# Patient Record
Sex: MALE | Race: WHITE | NOT HISPANIC OR LATINO | Employment: UNEMPLOYED | URBAN - METROPOLITAN AREA
[De-identification: names, ages, dates, MRNs, and addresses within clinical notes are randomized per-mention and may not be internally consistent; named-entity substitution may affect disease eponyms.]

---

## 2018-01-07 ENCOUNTER — OFFICE VISIT (OUTPATIENT)
Dept: URGENT CARE | Facility: CLINIC | Age: 53
End: 2018-01-07

## 2018-01-07 VITALS
RESPIRATION RATE: 20 BRPM | SYSTOLIC BLOOD PRESSURE: 114 MMHG | HEART RATE: 118 BPM | BODY MASS INDEX: 33.46 KG/M2 | WEIGHT: 239 LBS | OXYGEN SATURATION: 95 % | TEMPERATURE: 96.8 F | HEIGHT: 71 IN | DIASTOLIC BLOOD PRESSURE: 90 MMHG

## 2018-01-07 DIAGNOSIS — J40 BRONCHITIS: ICD-10-CM

## 2018-01-07 PROCEDURE — 99203 OFFICE O/P NEW LOW 30 MIN: CPT | Performed by: PHYSICIAN ASSISTANT

## 2018-01-07 RX ORDER — CIPROFLOXACIN 500 MG/1
500 TABLET, FILM COATED ORAL 2 TIMES DAILY
Qty: 20 TAB | Refills: 0 | Status: SHIPPED | OUTPATIENT
Start: 2018-01-07 | End: 2018-01-17

## 2018-01-07 RX ORDER — GABAPENTIN 300 MG/1
300 CAPSULE ORAL 3 TIMES DAILY
Qty: 90 CAP | Refills: 2 | Status: ON HOLD | OUTPATIENT
Start: 2018-01-07 | End: 2023-12-04

## 2018-01-07 ASSESSMENT — ENCOUNTER SYMPTOMS
EYES NEGATIVE: 1
CARDIOVASCULAR NEGATIVE: 1
BACK PAIN: 1
WHEEZING: 0
ABDOMINAL PAIN: 0
RHINORRHEA: 1
SHORTNESS OF BREATH: 0
COUGH: 1
CONSTITUTIONAL NEGATIVE: 1
SPUTUM PRODUCTION: 1
HEADACHES: 0

## 2018-01-07 NOTE — LETTER
January 7, 2018         Patient: Quan Quesada   YOB: 1965   Date of Visit: 1/7/2018           To Whom it May Concern:    Quan Quesada was seen in my clinic on 1/7/2018 for acute illness; please excuse today [and Thursday, as needed].    If you have any questions or concerns, please don't hesitate to call.        Sincerely,           Prasanna Ayers P.A.-C.  Electronically Signed

## 2018-01-07 NOTE — PROGRESS NOTES
"Subjective:      Quan Quesada is a 52 y.o. male who presents with URI (2 weeks off and on) and Back Pain            URI    This is a new (2wks cough, richard, ) problem. The current episode started 1 to 4 weeks ago. The problem has been unchanged. There has been no fever. Associated symptoms include congestion, coughing and rhinorrhea. Pertinent negatives include no abdominal pain, chest pain, headaches or wheezing. He has tried nothing for the symptoms. The treatment provided no relief.   Back Pain   Pertinent negatives include no abdominal pain, chest pain or headaches.       Review of Systems   Constitutional: Negative.    HENT: Positive for congestion and rhinorrhea.    Eyes: Negative.    Respiratory: Positive for cough and sputum production. Negative for shortness of breath and wheezing.    Cardiovascular: Negative.  Negative for chest pain.   Gastrointestinal: Negative for abdominal pain.   Musculoskeletal: Positive for back pain.   Skin: Negative.    Neurological: Negative for headaches.          Objective:     /90   Pulse (!) 118   Temp 36 °C (96.8 °F)   Resp 20   Ht 1.803 m (5' 11\")   Wt 108.4 kg (239 lb)   SpO2 95%   BMI 33.33 kg/m²      Physical Exam   Constitutional: He is oriented to person, place, and time. He appears well-developed and well-nourished. No distress.   HENT:   Head: Normocephalic and atraumatic.   Mouth/Throat: Oropharynx is clear and moist.   Eyes: EOM are normal. Pupils are equal, round, and reactive to light.   Neck: Normal range of motion. Neck supple.   Cardiovascular: Normal rate.    Pulmonary/Chest: Effort normal and breath sounds normal. No respiratory distress. He has no wheezes. He has no rales.   Lymphadenopathy:     He has no cervical adenopathy.   Neurological: He is alert and oriented to person, place, and time.   Skin: Skin is warm and dry.   Psychiatric: He has a normal mood and affect. His behavior is normal.   Nursing note and vitals reviewed.    Vitals: " "   01/07/18 0920   BP: 114/90   Pulse: (!) 118   Resp: 20   Temp: 36 °C (96.8 °F)   SpO2: 95%   Weight: 108.4 kg (239 lb)   Height: 1.803 m (5' 11\")     Active Ambulatory Problems     Diagnosis Date Noted   • Umbilical hernia 01/09/2015   • Ventral hernia 01/09/2015     Resolved Ambulatory Problems     Diagnosis Date Noted   • No Resolved Ambulatory Problems     Past Medical History:   Diagnosis Date   • Arrhythmia 2010   • Gout    • Indigestion    • Kidney stones 2007   • Snoring      Current Outpatient Prescriptions on File Prior to Visit   Medication Sig Dispense Refill   • oxycodone-acetaminophen (PERCOCET) 7.5-325 MG per tablet Take 1-2 Tabs by mouth every four hours as needed for Moderate Pain ((pain scale 4-6)). 40 Tab 0     No current facility-administered medications on file prior to visit.      Social History     Social History   • Marital status: Single     Spouse name: N/A   • Number of children: N/A   • Years of education: N/A     Occupational History   • Not on file.     Social History Main Topics   • Smoking status: Former Smoker     Packs/day: 1.00     Years: 20.00     Types: Cigarettes     Quit date: 3/3/2013   • Smokeless tobacco: Never Used   • Alcohol use Yes      Comment: 4-8 week   • Drug use: No   • Sexual activity: Not on file     Other Topics Concern   • Not on file     Social History Narrative   • No narrative on file     History reviewed. No pertinent family history.  Patient has no known allergies.            Assessment/Plan:     ·  bronchitis      · rx meds; otc prn      "

## 2018-02-09 ENCOUNTER — OFFICE VISIT (OUTPATIENT)
Dept: URGENT CARE | Facility: CLINIC | Age: 53
End: 2018-02-09
Payer: COMMERCIAL

## 2018-02-09 ENCOUNTER — APPOINTMENT (OUTPATIENT)
Dept: RADIOLOGY | Facility: IMAGING CENTER | Age: 53
End: 2018-02-09
Attending: NURSE PRACTITIONER
Payer: COMMERCIAL

## 2018-02-09 VITALS
RESPIRATION RATE: 16 BRPM | SYSTOLIC BLOOD PRESSURE: 124 MMHG | DIASTOLIC BLOOD PRESSURE: 86 MMHG | WEIGHT: 239 LBS | HEART RATE: 99 BPM | HEIGHT: 71 IN | OXYGEN SATURATION: 96 % | BODY MASS INDEX: 33.46 KG/M2 | TEMPERATURE: 97.2 F

## 2018-02-09 DIAGNOSIS — S99.912A INJURY OF LEFT ANKLE, INITIAL ENCOUNTER: ICD-10-CM

## 2018-02-09 DIAGNOSIS — S93.402A SPRAIN OF LEFT ANKLE, UNSPECIFIED LIGAMENT, INITIAL ENCOUNTER: ICD-10-CM

## 2018-02-09 DIAGNOSIS — S99.922A INJURY OF LEFT FOOT, INITIAL ENCOUNTER: ICD-10-CM

## 2018-02-09 PROCEDURE — 73610 X-RAY EXAM OF ANKLE: CPT | Mod: TC,LT | Performed by: NURSE PRACTITIONER

## 2018-02-09 PROCEDURE — 73630 X-RAY EXAM OF FOOT: CPT | Mod: TC,LT | Performed by: NURSE PRACTITIONER

## 2018-02-09 PROCEDURE — 99213 OFFICE O/P EST LOW 20 MIN: CPT | Performed by: NURSE PRACTITIONER

## 2018-02-09 RX ORDER — NAPROXEN 250 MG/1
250 TABLET ORAL 2 TIMES DAILY WITH MEALS
COMMUNITY
End: 2023-11-29

## 2018-02-09 ASSESSMENT — ENCOUNTER SYMPTOMS
FOCAL WEAKNESS: 0
NEUROLOGICAL NEGATIVE: 1
TINGLING: 0
GASTROINTESTINAL NEGATIVE: 1
PSYCHIATRIC NEGATIVE: 1
CARDIOVASCULAR NEGATIVE: 1
NECK PAIN: 0
SENSORY CHANGE: 0
BACK PAIN: 0
RESPIRATORY NEGATIVE: 1
CONSTITUTIONAL NEGATIVE: 1

## 2018-02-09 NOTE — LETTER
February 9, 2018         Patient: Quan Quesada   YOB: 1965   Date of Visit: 2/9/2018           To Whom it May Concern:    Quan Quesada was seen in my clinic on 2/9/2018. He may be excused from work his next four scheduled shifts.     If you have any questions or concerns, please don't hesitate to call.        Sincerely,           SHANI Mckenzie.  Electronically Signed

## 2018-02-12 ENCOUNTER — OFFICE VISIT (OUTPATIENT)
Dept: MEDICAL GROUP | Facility: CLINIC | Age: 53
End: 2018-02-12
Payer: COMMERCIAL

## 2018-02-12 VITALS
WEIGHT: 239 LBS | HEART RATE: 98 BPM | HEIGHT: 71 IN | SYSTOLIC BLOOD PRESSURE: 124 MMHG | RESPIRATION RATE: 18 BRPM | OXYGEN SATURATION: 95 % | DIASTOLIC BLOOD PRESSURE: 84 MMHG | BODY MASS INDEX: 33.46 KG/M2 | TEMPERATURE: 97.9 F

## 2018-02-12 DIAGNOSIS — M25.572 ACUTE LEFT ANKLE PAIN: ICD-10-CM

## 2018-02-12 DIAGNOSIS — M19.172 POST-TRAUMATIC OSTEOARTHRITIS OF LEFT ANKLE: ICD-10-CM

## 2018-02-12 PROCEDURE — 99203 OFFICE O/P NEW LOW 30 MIN: CPT | Performed by: FAMILY MEDICINE

## 2018-02-12 ASSESSMENT — ENCOUNTER SYMPTOMS
SHORTNESS OF BREATH: 0
VOMITING: 0
FEVER: 0
CHILLS: 0
DIZZINESS: 0
HEADACHES: 0
NAUSEA: 0

## 2018-03-01 ENCOUNTER — OFFICE VISIT (OUTPATIENT)
Dept: MEDICAL GROUP | Facility: CLINIC | Age: 53
End: 2018-03-01
Payer: COMMERCIAL

## 2018-03-01 VITALS
HEIGHT: 71 IN | SYSTOLIC BLOOD PRESSURE: 122 MMHG | OXYGEN SATURATION: 94 % | BODY MASS INDEX: 33.46 KG/M2 | RESPIRATION RATE: 18 BRPM | TEMPERATURE: 97.9 F | WEIGHT: 239 LBS | DIASTOLIC BLOOD PRESSURE: 84 MMHG | HEART RATE: 98 BPM

## 2018-03-01 DIAGNOSIS — M19.172 POST-TRAUMATIC OSTEOARTHRITIS OF LEFT ANKLE: ICD-10-CM

## 2018-03-01 PROCEDURE — 99213 OFFICE O/P EST LOW 20 MIN: CPT | Performed by: FAMILY MEDICINE

## 2018-03-01 ASSESSMENT — ENCOUNTER SYMPTOMS
HEADACHES: 0
FEVER: 0
NAUSEA: 0
SHORTNESS OF BREATH: 0
DIZZINESS: 0
VOMITING: 0
CHILLS: 0

## 2018-03-01 NOTE — PATIENT INSTRUCTIONS
Discussed osteoarthritis management options includin.  Joint protection, glucosamine/chondroitin and anti-inflammatories or tylenol with flares  2.  Non-offending activities such as cycling or swimming   3.  Ankle bracing, Acupuncture is also another option  4. Injection options including corticosteroid  5. Surgical options exist for individuals that have failed non-surgical management

## 2018-03-01 NOTE — PROGRESS NOTES
Subjective:      Quan Quesada is a 52 y.o. male who presents with Ankle Injury (F/V L ankle sprain )       FOLLOW up for LEFT ankle pain    HPI   LEFT ankle pain  Date of injury February 7, 2018  Inversion injury while walking  Pain was a gradual onset  Burning pain  Medial ankle and the dorsal aspect of the foot  No radiation    NO swelling   Question of possible remote injury, however patient does not recall details (chopping wood several yrs ago)  No night symptoms  Stopped using boot a few days ago since pain was better    Review of Systems   Constitutional: Negative for chills and fever.   Respiratory: Negative for shortness of breath.    Cardiovascular: Negative for chest pain.   Gastrointestinal: Negative for nausea and vomiting.   Neurological: Negative for dizziness and headaches.     PMH:  has a past medical history of Arrhythmia (2010); Gout; Indigestion; Kidney stones (2007); and Snoring.  MEDS:   Current Outpatient Prescriptions:   •  naproxen (NAPROSYN) 250 MG Tab, Take 250 mg by mouth 2 times a day, with meals., Disp: , Rfl:   •  gabapentin (NEURONTIN) 300 MG Cap, Take 1 Cap by mouth 3 times a day., Disp: 90 Cap, Rfl: 2  •  oxycodone-acetaminophen (PERCOCET) 7.5-325 MG per tablet, Take 1-2 Tabs by mouth every four hours as needed for Moderate Pain ((pain scale 4-6))., Disp: 40 Tab, Rfl: 0  ALLERGIES: No Known Allergies  SURGHX:   Past Surgical History:   Procedure Laterality Date   • VENTRAL HERNIA REPAIR  1/9/2015    Performed by Romel Wilson M.D. at SURGERY [facility]   • UMBILICAL HERNIA REPAIR  1/9/2015    Performed by Romel Wilson M.D. at SURGERY [facility]   • OTHER  1969    hernia umbilical   • OTHER      cosmetic forehead   • PB APPENDECTOMY      age 12     SOCHX:  reports that he quit smoking about 4 years ago. His smoking use included Cigarettes. He has a 20.00 pack-year smoking history. He has never used smokeless tobacco. He reports that he drinks alcohol. He reports  "that he does not use drugs.  FH: Family history was reviewed, no pertinent findings to report       Objective:     /84   Pulse 98   Temp 36.6 °C (97.9 °F)   Resp 18   Ht 1.803 m (5' 10.98\")   Wt 108.4 kg (239 lb)   SpO2 94%   BMI 33.35 kg/m²      Physical Exam        LEFT ANKLE:  There is NO swelling noted at the ankle  Range of motion NORMAL with dorsiflexion and plantarflexion, inversion and eversion  There is NO tenderness of the ATFL, without tenderness of the CF or PTf ligament  There is NO tenderness of the lateral malleolus or medial malleolus  Anterior drawer testing is POSITIVE  Talar tilt testing is NEGATIVE  The foot and ankle is otherwise neurovascularly intact    LEFT FOOT:  There is NO swelling noted at the foot  There is NO tenderness at the base of the fifth metatarsal, cuboid, or tarsal navicular  There is NO pain with metatarsal squeeze test    NEUTRAL stance  Able to ambulate with ANTALGIC gait  NOW ABLE TOE off on BOTH FEET       Assessment/Plan:     1. Post-traumatic osteoarthritis of left ankle        LEFT ankle pain with posttraumatic arthritis which is likely causing his pain  Unlikely tibialis tendinitis since he can toe off without difficulty    Follow up PRN, CONSIDER intra-articular steroid injection of the LEFT ankle if symptoms persist or worsen      Results for orders placed in visit on 02/09/18   DX-ANKLE 3+ VIEWS LEFT    Impression 1.  No evidence of acute fracture or dislocation.    2.  Old post traumatic change of the medial and lateral malleoli.    3.  Soft tissue swelling.    4.  Joint effusion.                          Results for orders placed in visit on 02/09/18   DX-FOOT-COMPLETE 3+ LEFT    Impression No evidence of acute fracture or dislocation.                                                                  Thank you SAMY Yin for allowing me to participate in caring for your patient.       "

## 2018-12-31 ENCOUNTER — HOSPITAL ENCOUNTER (EMERGENCY)
Dept: HOSPITAL 8 - ED | Age: 53
Discharge: HOME | End: 2018-12-31
Payer: MEDICAID

## 2018-12-31 VITALS — BODY MASS INDEX: 35.49 KG/M2 | HEIGHT: 71 IN | WEIGHT: 253.53 LBS

## 2018-12-31 VITALS — DIASTOLIC BLOOD PRESSURE: 64 MMHG | SYSTOLIC BLOOD PRESSURE: 110 MMHG

## 2018-12-31 DIAGNOSIS — I47.1: Primary | ICD-10-CM

## 2018-12-31 LAB
ALBUMIN SERPL-MCNC: 3.5 G/DL (ref 3.4–5)
ALP SERPL-CCNC: 83 U/L (ref 45–117)
ALT SERPL-CCNC: 47 U/L (ref 12–78)
ANION GAP SERPL CALC-SCNC: 7 MMOL/L (ref 5–15)
BASOPHILS # BLD AUTO: 0.04 X10^3/UL (ref 0–0.1)
BASOPHILS NFR BLD AUTO: 1 % (ref 0–1)
BILIRUB SERPL-MCNC: 0.3 MG/DL (ref 0.2–1)
CALCIUM SERPL-MCNC: 8.1 MG/DL (ref 8.5–10.1)
CHLORIDE SERPL-SCNC: 109 MMOL/L (ref 98–107)
CREAT SERPL-MCNC: 1.05 MG/DL (ref 0.7–1.3)
EOSINOPHIL # BLD AUTO: 0.37 X10^3/UL (ref 0–0.4)
EOSINOPHIL NFR BLD AUTO: 5 % (ref 1–7)
ERYTHROCYTE [DISTWIDTH] IN BLOOD BY AUTOMATED COUNT: 13.9 % (ref 9.4–14.8)
LYMPHOCYTES # BLD AUTO: 1.96 X10^3/UL (ref 1–3.4)
LYMPHOCYTES NFR BLD AUTO: 27 % (ref 22–44)
MCH RBC QN AUTO: 31.6 PG (ref 27.5–34.5)
MCHC RBC AUTO-ENTMCNC: 34.3 G/DL (ref 33.2–36.2)
MCV RBC AUTO: 92.2 FL (ref 81–97)
MD: NO
MONOCYTES # BLD AUTO: 0.62 X10^3/UL (ref 0.2–0.8)
MONOCYTES NFR BLD AUTO: 9 % (ref 2–9)
NEUTROPHILS # BLD AUTO: 4.34 X10^3/UL (ref 1.8–6.8)
NEUTROPHILS NFR BLD AUTO: 59 % (ref 42–75)
PLATELET # BLD AUTO: 217 X10^3/UL (ref 130–400)
PMV BLD AUTO: 8 FL (ref 7.4–10.4)
PROT SERPL-MCNC: 7.2 G/DL (ref 6.4–8.2)
RBC # BLD AUTO: 5.02 X10^6/UL (ref 4.38–5.82)
TROPONIN I SERPL-MCNC: 0.02 NG/ML (ref 0–0.04)

## 2018-12-31 PROCEDURE — 99284 EMERGENCY DEPT VISIT MOD MDM: CPT

## 2018-12-31 PROCEDURE — 80053 COMPREHEN METABOLIC PANEL: CPT

## 2018-12-31 PROCEDURE — 84484 ASSAY OF TROPONIN QUANT: CPT

## 2018-12-31 PROCEDURE — 71045 X-RAY EXAM CHEST 1 VIEW: CPT

## 2018-12-31 PROCEDURE — 93005 ELECTROCARDIOGRAM TRACING: CPT

## 2018-12-31 PROCEDURE — 36415 COLL VENOUS BLD VENIPUNCTURE: CPT

## 2018-12-31 PROCEDURE — 83605 ASSAY OF LACTIC ACID: CPT

## 2018-12-31 PROCEDURE — 85025 COMPLETE CBC W/AUTO DIFF WBC: CPT

## 2019-06-04 ENCOUNTER — APPOINTMENT (OUTPATIENT)
Dept: RADIOLOGY | Facility: IMAGING CENTER | Age: 54
End: 2019-06-04
Attending: PHYSICIAN ASSISTANT
Payer: COMMERCIAL

## 2019-06-04 ENCOUNTER — OFFICE VISIT (OUTPATIENT)
Dept: URGENT CARE | Facility: CLINIC | Age: 54
End: 2019-06-04
Payer: COMMERCIAL

## 2019-06-04 VITALS
HEART RATE: 105 BPM | DIASTOLIC BLOOD PRESSURE: 80 MMHG | HEIGHT: 71 IN | OXYGEN SATURATION: 92 % | RESPIRATION RATE: 16 BRPM | SYSTOLIC BLOOD PRESSURE: 124 MMHG | BODY MASS INDEX: 36.12 KG/M2 | WEIGHT: 258 LBS | TEMPERATURE: 99.4 F

## 2019-06-04 DIAGNOSIS — R05.9 COUGH: ICD-10-CM

## 2019-06-04 DIAGNOSIS — J22 LRTI (LOWER RESPIRATORY TRACT INFECTION): ICD-10-CM

## 2019-06-04 DIAGNOSIS — H10.31 ACUTE BACTERIAL CONJUNCTIVITIS OF RIGHT EYE: ICD-10-CM

## 2019-06-04 PROCEDURE — 99214 OFFICE O/P EST MOD 30 MIN: CPT | Performed by: PHYSICIAN ASSISTANT

## 2019-06-04 PROCEDURE — 71046 X-RAY EXAM CHEST 2 VIEWS: CPT | Mod: TC | Performed by: PHYSICIAN ASSISTANT

## 2019-06-04 RX ORDER — PROMETHAZINE HYDROCHLORIDE AND CODEINE PHOSPHATE 6.25; 1 MG/5ML; MG/5ML
5 SYRUP ORAL EVERY 12 HOURS PRN
Qty: 60 ML | Refills: 0 | Status: SHIPPED | OUTPATIENT
Start: 2019-06-04 | End: 2019-06-11

## 2019-06-04 RX ORDER — POLYMYXIN B SULFATE AND TRIMETHOPRIM 1; 10000 MG/ML; [USP'U]/ML
1 SOLUTION OPHTHALMIC EVERY 4 HOURS
Qty: 10 ML | Refills: 0 | Status: SHIPPED | OUTPATIENT
Start: 2019-06-04 | End: 2019-06-11

## 2019-06-04 RX ORDER — AZITHROMYCIN 250 MG/1
TABLET, FILM COATED ORAL
Qty: 6 TAB | Refills: 0 | Status: SHIPPED | OUTPATIENT
Start: 2019-06-04 | End: 2023-11-29

## 2019-06-04 ASSESSMENT — ENCOUNTER SYMPTOMS
PHOTOPHOBIA: 0
NAUSEA: 0
SHORTNESS OF BREATH: 0
SPUTUM PRODUCTION: 1
ABDOMINAL PAIN: 0
EYE REDNESS: 1
FEVER: 1
SORE THROAT: 1
DIARRHEA: 0
EYE DISCHARGE: 1
WHEEZING: 0
EYE PAIN: 0
COUGH: 1
VOMITING: 0
CHILLS: 1
BLURRED VISION: 0
DOUBLE VISION: 0

## 2019-06-04 NOTE — PROGRESS NOTES
"Subjective:   Quan Quesada is a 53 y.o. male who presents for Cough (st,fever,runny nose x3-4 days)         Cough    This is a new problem. The current episode started in the past 7 days. Associated symptoms include chills, eye redness, a fever and a sore throat. Pertinent negatives include no ear pain, rash, shortness of breath or wheezing.     Patient complains of last 4 to 5 days of symptoms of cough productive in nature, sore throat, subjective fevers and chills, redness irritation and itch and drainage from right eye, denies ear pain, complains of sinus and chest congestion, denies nausea vomiting abdominal pain diarrhea or rash.  Denies past medical history of asthma.  Denies history of pneumonia.  Notes past medical history of bronchitis and seasonal allergies.  Denies treatments thus far    Review of Systems   Constitutional: Positive for chills and fever.   HENT: Positive for congestion and sore throat. Negative for ear pain.    Eyes: Positive for discharge and redness. Negative for blurred vision, double vision, photophobia and pain.   Respiratory: Positive for cough and sputum production. Negative for shortness of breath and wheezing.    Gastrointestinal: Negative for abdominal pain, diarrhea, nausea and vomiting.   Skin: Negative for rash.     No Known Allergies   I have worn a mask for the entire encounter with this patient.    Objective:   /80 (BP Location: Right arm, Patient Position: Sitting, BP Cuff Size: Large adult)   Pulse (!) 105   Temp 37.4 °C (99.4 °F)   Resp 16   Ht 1.803 m (5' 11\")   Wt 117 kg (258 lb)   SpO2 92%   BMI 35.98 kg/m²    Physical Exam   Constitutional: He is oriented to person, place, and time. He appears well-developed and well-nourished. No distress.   HENT:   Head: Normocephalic and atraumatic.   Right Ear: Tympanic membrane, external ear and ear canal normal.   Left Ear: Tympanic membrane, external ear and ear canal normal.   Nose: Nose normal. "   Mouth/Throat: Uvula is midline and mucous membranes are normal. Posterior oropharyngeal erythema ( mild PND) present. No oropharyngeal exudate, posterior oropharyngeal edema or tonsillar abscesses.   Eyes: Pupils are equal, round, and reactive to light. EOM and lids are normal. Right eye exhibits no chemosis and no discharge. Left eye exhibits no chemosis and no discharge. Right conjunctiva is injected. Right conjunctiva has no hemorrhage. Left conjunctiva is not injected. Left conjunctiva has no hemorrhage. No scleral icterus.   Neck: Neck supple.   Pulmonary/Chest: Effort normal. No accessory muscle usage. No respiratory distress. He has no decreased breath sounds. He has no wheezes. He has rhonchi (trace). He has no rales.   Musculoskeletal: Normal range of motion.   Lymphadenopathy:     He has cervical adenopathy ( mild bilat).   Neurological: He is alert and oriented to person, place, and time. Coordination normal.   Skin: Skin is warm and dry. He is not diaphoretic. No pallor.   Psychiatric: He has a normal mood and affect.   Nursing note and vitals reviewed.  CXR - Impression       No acute cardiopulmonary process is identified.   Reading Provider Reading Date   Genaro Mckeon M.D. Jun 4, 2019   Signing Provider Signing Date Signing Time   Genaro Mckeon M.D. Jun 4, 2019  8:48 AM           Assessment/Plan:   1. LRTI (lower respiratory tract infection)    2. Cough  - DX-CHEST-2 VIEWS; Future    3. Acute bacterial conjunctivitis of right eye  Supportive care is reviewed with patient/caregiver - recommend to push PO fluids and electrolytes,  take full course of Rx, take with probiotics, observe for resolution  Return to clinic with lack of resolution or progression of symptoms.  Nsaids/tylenol, netti pot/saline irrig, humidifier in home, flonase, ponaris, antihistamine  polytrim for eye, discussed typical course, Cautioned regarding potential for sedation with medication.    Differential diagnosis,  natural history, supportive care, and indications for immediate follow-up discussed.

## 2023-09-13 ENCOUNTER — OFFICE VISIT (OUTPATIENT)
Dept: URGENT CARE | Facility: CLINIC | Age: 58
End: 2023-09-13
Payer: COMMERCIAL

## 2023-09-13 VITALS
TEMPERATURE: 98.5 F | HEIGHT: 71 IN | OXYGEN SATURATION: 95 % | WEIGHT: 269.4 LBS | RESPIRATION RATE: 20 BRPM | DIASTOLIC BLOOD PRESSURE: 78 MMHG | HEART RATE: 90 BPM | BODY MASS INDEX: 37.72 KG/M2 | SYSTOLIC BLOOD PRESSURE: 130 MMHG

## 2023-09-13 DIAGNOSIS — H11.32 CONJUNCTIVAL HEMORRHAGE OF LEFT EYE: ICD-10-CM

## 2023-09-13 PROCEDURE — 99203 OFFICE O/P NEW LOW 30 MIN: CPT | Performed by: NURSE PRACTITIONER

## 2023-09-13 PROCEDURE — 3078F DIAST BP <80 MM HG: CPT | Performed by: NURSE PRACTITIONER

## 2023-09-13 PROCEDURE — 3075F SYST BP GE 130 - 139MM HG: CPT | Performed by: NURSE PRACTITIONER

## 2023-09-13 RX ORDER — OFLOXACIN 3 MG/ML
1 SOLUTION/ DROPS OPHTHALMIC 4 TIMES DAILY
Qty: 5 ML | Refills: 0 | Status: SHIPPED | OUTPATIENT
Start: 2023-09-13 | End: 2023-09-23

## 2023-09-14 NOTE — PROGRESS NOTES
Date: 09/13/23     Chief Complaint:    Chief Complaint   Patient presents with    Eye Injury     X3days left eye red/no discomfort        History of Present Illness: 58 y.o.  male presents to clinic with a possible injury to his left eye.  Patient states 3 days ago he was lighting a match and a piece of the match flew towards his eye.  He states he is unsure if this piece of the match hit his eye.  He states he had no pain or discomfort after the injury.  He states yesterday he noticed redness to the left lateral part of his eye.  He states it does not hurt or itch.  He denies any vision changes.  He does not wear contacts.  He states he does have an appointment with an eye doctor in 3 days time.      ROS:    As stated in HPI     Medical/SX/ Social History:  Reviewed per chart    Pertinent Medications:    Current Outpatient Medications on File Prior to Visit   Medication Sig Dispense Refill    azithromycin (ZITHROMAX) 250 MG Tab Take as directed on package. Dispense one package. (Patient not taking: Reported on 9/13/2023) 6 Tab 0    naproxen (NAPROSYN) 250 MG Tab Take 250 mg by mouth 2 times a day, with meals. (Patient not taking: Reported on 9/13/2023)      gabapentin (NEURONTIN) 300 MG Cap Take 1 Cap by mouth 3 times a day. 90 Cap 2    oxycodone-acetaminophen (PERCOCET) 7.5-325 MG per tablet Take 1-2 Tabs by mouth every four hours as needed for Moderate Pain ((pain scale 4-6)). (Patient not taking: Reported on 9/13/2023) 40 Tab 0     No current facility-administered medications on file prior to visit.        Allergies:    Patient has no known allergies.     Problem list, medications, and allergies reviewed by myself today in Epic     Physical Exam:    Vitals:    09/13/23 1712   BP: 130/78   Pulse: 90   Resp: 20   Temp: 36.9 °C (98.5 °F)   SpO2: 95%             Physical Exam  Constitutional:       General: He is awake.      Appearance: Normal appearance. He is not ill-appearing, toxic-appearing or diaphoretic.    HENT:      Head: Normocephalic and atraumatic.      Right Ear: Tympanic membrane, ear canal and external ear normal.      Left Ear: Tympanic membrane, ear canal and external ear normal.   Eyes:      General: Lids are normal. Gaze aligned appropriately. No allergic shiner or scleral icterus.     Extraocular Movements: Extraocular movements intact.      Conjunctiva/sclera:      Left eye: Left conjunctiva is not injected. Hemorrhage present. No chemosis or exudate.     Pupils: Pupils are equal, round, and reactive to light.      Comments: Eye exam:  Procedure explained and verbal consent obtained.  left eye was anesthetized with 2 drops of proparacaine.  This did relieve the patient's irritation of his left eye.  Additional drop of proparacaine was used to stain the eye with a fluorescein strip.Using a Wood's lamp, I did assess the eye.  Lid was everted no foreign body noted. No abrasions noted.  Negative Melodie sign.       R 20/25   L20/25   B20/20    Cardiovascular:      Rate and Rhythm: Normal rate and regular rhythm.      Pulses:           Radial pulses are 2+ on the right side and 2+ on the left side.      Heart sounds: Normal heart sounds.   Pulmonary:      Effort: Pulmonary effort is normal.      Breath sounds: Normal breath sounds and air entry. No decreased breath sounds, wheezing, rhonchi or rales.   Musculoskeletal:      Right lower leg: No edema.      Left lower leg: No edema.   Lymphadenopathy:      Cervical: No cervical adenopathy.   Skin:     General: Skin is warm.      Capillary Refill: Capillary refill takes less than 2 seconds.      Coloration: Skin is not cyanotic or pale.   Neurological:      Mental Status: He is alert and oriented to person, place, and time.      Gait: Gait is intact.   Psychiatric:         Behavior: Behavior normal. Behavior is cooperative.            Medical Decision making and plan :  I personally reviewed prior external notes and test results pertinent to today's visit. Pt is  clinically stable at today's acute urgent care visit.  Patient appears nontoxic with no acute distress noted. Appropriate for outpatient care at this time. The patient remained stable during the urgent care visit.     Pleasant 58 y.o. male presented clinic with a subconjunctival hemorrhage of the left eye.  Due to possible eye trauma did send in for ofloxacin although there is no cornea abrasion scratches on Woods lamp exam.  Advised patient to continue to follow-up with his eye doctor in 3 days time.  Shared decision-making was utilized with patient for treatment plan.Differential Diagnosis, natural history, and supportive care discussed.    1. Conjunctival hemorrhage of left eye    - ofloxacin (OCUFLOX) 0.3 % Solution; Administer 1 Drop into the left eye 4 times a day for 10 days.  Dispense: 5 mL; Refill: 0        Medication discussed included indication for use and the potential benefits and side effects. Education was provided regarding the aforementioned assessments.  All of the patient's questions were answered to their satisfaction at the time of discharge. Patient was encouraged to monitor symptoms closely. Those signs and symptoms which would warrant concern and mandate seeking a higher level of service through the emergency department discussed at length.  Patient stated agreement and understanding of this plan of care.    Disposition:  Home in stable condition       Voice Recognition Disclaimer:  Portions of this document were created using voice recognition software. The software does have a chance of producing errors of grammar and possibly content. I have made every reasonable attempt to correct obvious errors, but there may be errors of grammar and possibly content that I did not discover before finalizing the documentation.    SHANI Lucero.

## 2023-11-20 ENCOUNTER — HOSPITAL ENCOUNTER (OUTPATIENT)
Facility: MEDICAL CENTER | Age: 58
DRG: 455 | End: 2023-11-20
Attending: NEUROLOGICAL SURGERY | Admitting: NEUROLOGICAL SURGERY
Payer: COMMERCIAL

## 2023-11-21 ENCOUNTER — APPOINTMENT (OUTPATIENT)
Dept: ADMISSIONS | Facility: MEDICAL CENTER | Age: 58
DRG: 455 | End: 2023-11-21
Attending: NEUROLOGICAL SURGERY
Payer: COMMERCIAL

## 2023-11-27 ENCOUNTER — HOSPITAL ENCOUNTER (OUTPATIENT)
Dept: CARDIOLOGY | Facility: MEDICAL CENTER | Age: 58
End: 2023-11-27
Attending: INTERNAL MEDICINE
Payer: COMMERCIAL

## 2023-11-27 ENCOUNTER — HOSPITAL ENCOUNTER (OUTPATIENT)
Dept: LAB | Facility: MEDICAL CENTER | Age: 58
End: 2023-11-27
Attending: INTERNAL MEDICINE
Payer: COMMERCIAL

## 2023-11-27 ENCOUNTER — HOSPITAL ENCOUNTER (OUTPATIENT)
Dept: RADIOLOGY | Facility: MEDICAL CENTER | Age: 58
End: 2023-11-27
Attending: INTERNAL MEDICINE
Payer: COMMERCIAL

## 2023-11-27 DIAGNOSIS — Z01.811 PRE-OPERATIVE RESPIRATORY EXAMINATION: ICD-10-CM

## 2023-11-27 DIAGNOSIS — Z01.812 PRE-OPERATIVE LABORATORY EXAMINATION: ICD-10-CM

## 2023-11-27 DIAGNOSIS — Z01.810 PRE-OPERATIVE CARDIOVASCULAR EXAMINATION: ICD-10-CM

## 2023-11-27 DIAGNOSIS — R94.31 NONSPECIFIC ABNORMAL ELECTROCARDIOGRAM (ECG) (EKG): ICD-10-CM

## 2023-11-27 DIAGNOSIS — M43.06 SPONDYLOLYSIS, LUMBAR REGION: ICD-10-CM

## 2023-11-27 DIAGNOSIS — R79.1 ABNORMAL COAGULATION PROFILE: ICD-10-CM

## 2023-11-27 DIAGNOSIS — R82.90 NONSPECIFIC FINDING ON EXAMINATION OF URINE: ICD-10-CM

## 2023-11-27 DIAGNOSIS — M48.061 SPINAL STENOSIS, LUMBAR REGION, WITHOUT NEUROGENIC CLAUDICATION: ICD-10-CM

## 2023-11-27 LAB
ANION GAP SERPL CALC-SCNC: 7 MMOL/L (ref 7–16)
APPEARANCE UR: CLEAR
APTT PPP: 37.9 SEC (ref 24.7–36)
BASOPHILS # BLD AUTO: 0.9 % (ref 0–1.8)
BASOPHILS # BLD: 0.06 K/UL (ref 0–0.12)
BILIRUB UR QL STRIP.AUTO: NEGATIVE
BUN SERPL-MCNC: 12 MG/DL (ref 8–22)
CALCIUM SERPL-MCNC: 9.3 MG/DL (ref 8.5–10.5)
CHLORIDE SERPL-SCNC: 104 MMOL/L (ref 96–112)
CO2 SERPL-SCNC: 32 MMOL/L (ref 20–33)
COLOR UR: YELLOW
CREAT SERPL-MCNC: 0.76 MG/DL (ref 0.5–1.4)
EKG IMPRESSION: NORMAL
EOSINOPHIL # BLD AUTO: 0.22 K/UL (ref 0–0.51)
EOSINOPHIL NFR BLD: 3.3 % (ref 0–6.9)
ERYTHROCYTE [DISTWIDTH] IN BLOOD BY AUTOMATED COUNT: 47.2 FL (ref 35.9–50)
GFR SERPLBLD CREATININE-BSD FMLA CKD-EPI: 105 ML/MIN/1.73 M 2
GLUCOSE SERPL-MCNC: 79 MG/DL (ref 65–99)
GLUCOSE UR STRIP.AUTO-MCNC: NEGATIVE MG/DL
HCT VFR BLD AUTO: 51.4 % (ref 42–52)
HGB BLD-MCNC: 16.7 G/DL (ref 14–18)
IMM GRANULOCYTES # BLD AUTO: 0.01 K/UL (ref 0–0.11)
IMM GRANULOCYTES NFR BLD AUTO: 0.1 % (ref 0–0.9)
INR PPP: 0.92 (ref 0.87–1.13)
KETONES UR STRIP.AUTO-MCNC: NEGATIVE MG/DL
LEUKOCYTE ESTERASE UR QL STRIP.AUTO: NEGATIVE
LYMPHOCYTES # BLD AUTO: 1.93 K/UL (ref 1–4.8)
LYMPHOCYTES NFR BLD: 28.8 % (ref 22–41)
MCH RBC QN AUTO: 31.3 PG (ref 27–33)
MCHC RBC AUTO-ENTMCNC: 32.5 G/DL (ref 32.3–36.5)
MCV RBC AUTO: 96.3 FL (ref 81.4–97.8)
MICRO URNS: NORMAL
MONOCYTES # BLD AUTO: 0.73 K/UL (ref 0–0.85)
MONOCYTES NFR BLD AUTO: 10.9 % (ref 0–13.4)
NEUTROPHILS # BLD AUTO: 3.75 K/UL (ref 1.82–7.42)
NEUTROPHILS NFR BLD: 56 % (ref 44–72)
NITRITE UR QL STRIP.AUTO: NEGATIVE
NRBC # BLD AUTO: 0 K/UL
NRBC BLD-RTO: 0 /100 WBC (ref 0–0.2)
PH UR STRIP.AUTO: 7.5 [PH] (ref 5–8)
PLATELET # BLD AUTO: 219 K/UL (ref 164–446)
PMV BLD AUTO: 10.2 FL (ref 9–12.9)
POTASSIUM SERPL-SCNC: 4.5 MMOL/L (ref 3.6–5.5)
PROT UR QL STRIP: NEGATIVE MG/DL
PROTHROMBIN TIME: 12.4 SEC (ref 12–14.6)
RBC # BLD AUTO: 5.34 M/UL (ref 4.7–6.1)
RBC UR QL AUTO: NEGATIVE
SODIUM SERPL-SCNC: 143 MMOL/L (ref 135–145)
SP GR UR STRIP.AUTO: 1.02
UROBILINOGEN UR STRIP.AUTO-MCNC: 0.2 MG/DL
WBC # BLD AUTO: 6.7 K/UL (ref 4.8–10.8)

## 2023-11-27 PROCEDURE — 93010 ELECTROCARDIOGRAM REPORT: CPT | Performed by: INTERNAL MEDICINE

## 2023-11-27 PROCEDURE — 85025 COMPLETE CBC W/AUTO DIFF WBC: CPT

## 2023-11-27 PROCEDURE — 80048 BASIC METABOLIC PNL TOTAL CA: CPT

## 2023-11-27 PROCEDURE — 85610 PROTHROMBIN TIME: CPT

## 2023-11-27 PROCEDURE — 36415 COLL VENOUS BLD VENIPUNCTURE: CPT

## 2023-11-27 PROCEDURE — 85730 THROMBOPLASTIN TIME PARTIAL: CPT

## 2023-11-27 PROCEDURE — 81003 URINALYSIS AUTO W/O SCOPE: CPT

## 2023-11-27 PROCEDURE — 71046 X-RAY EXAM CHEST 2 VIEWS: CPT

## 2023-11-27 PROCEDURE — 72110 X-RAY EXAM L-2 SPINE 4/>VWS: CPT

## 2023-11-27 PROCEDURE — 93005 ELECTROCARDIOGRAM TRACING: CPT | Performed by: NEUROLOGICAL SURGERY

## 2023-12-04 ENCOUNTER — APPOINTMENT (OUTPATIENT)
Dept: RADIOLOGY | Facility: MEDICAL CENTER | Age: 58
DRG: 455 | End: 2023-12-04
Attending: NEUROLOGICAL SURGERY
Payer: COMMERCIAL

## 2023-12-04 ENCOUNTER — HOSPITAL ENCOUNTER (INPATIENT)
Facility: MEDICAL CENTER | Age: 58
LOS: 5 days | DRG: 455 | End: 2023-12-09
Attending: NEUROLOGICAL SURGERY | Admitting: NEUROLOGICAL SURGERY
Payer: COMMERCIAL

## 2023-12-04 ENCOUNTER — ANESTHESIA (OUTPATIENT)
Dept: SURGERY | Facility: MEDICAL CENTER | Age: 58
DRG: 455 | End: 2023-12-04
Payer: COMMERCIAL

## 2023-12-04 ENCOUNTER — ANESTHESIA EVENT (OUTPATIENT)
Dept: SURGERY | Facility: MEDICAL CENTER | Age: 58
DRG: 455 | End: 2023-12-04
Payer: COMMERCIAL

## 2023-12-04 DIAGNOSIS — G89.18 POSTOPERATIVE PAIN: ICD-10-CM

## 2023-12-04 PROCEDURE — A9270 NON-COVERED ITEM OR SERVICE: HCPCS | Performed by: PHYSICIAN ASSISTANT

## 2023-12-04 PROCEDURE — 700101 HCHG RX REV CODE 250: Performed by: ANESTHESIOLOGY

## 2023-12-04 PROCEDURE — 160048 HCHG OR STATISTICAL LEVEL 1-5: Performed by: NEUROLOGICAL SURGERY

## 2023-12-04 PROCEDURE — 700111 HCHG RX REV CODE 636 W/ 250 OVERRIDE (IP): Performed by: ANESTHESIOLOGY

## 2023-12-04 PROCEDURE — 700105 HCHG RX REV CODE 258: Performed by: NEUROLOGICAL SURGERY

## 2023-12-04 PROCEDURE — 160031 HCHG SURGERY MINUTES - 1ST 30 MINS LEVEL 5: Performed by: NEUROLOGICAL SURGERY

## 2023-12-04 PROCEDURE — 700101 HCHG RX REV CODE 250: Performed by: NEUROLOGICAL SURGERY

## 2023-12-04 PROCEDURE — 72100 X-RAY EXAM L-S SPINE 2/3 VWS: CPT

## 2023-12-04 PROCEDURE — 700106 HCHG RX REV CODE 271: Performed by: NEUROLOGICAL SURGERY

## 2023-12-04 PROCEDURE — 770001 HCHG ROOM/CARE - MED/SURG/GYN PRIV*

## 2023-12-04 PROCEDURE — C1713 ANCHOR/SCREW BN/BN,TIS/BN: HCPCS | Performed by: NEUROLOGICAL SURGERY

## 2023-12-04 PROCEDURE — A9270 NON-COVERED ITEM OR SERVICE: HCPCS | Performed by: ANESTHESIOLOGY

## 2023-12-04 PROCEDURE — 160035 HCHG PACU - 1ST 60 MINS PHASE I: Performed by: NEUROLOGICAL SURGERY

## 2023-12-04 PROCEDURE — 0SG1071 FUSION OF 2 OR MORE LUMBAR VERTEBRAL JOINTS WITH AUTOLOGOUS TISSUE SUBSTITUTE, POSTERIOR APPROACH, POSTERIOR COLUMN, OPEN APPROACH: ICD-10-PCS | Performed by: NEUROLOGICAL SURGERY

## 2023-12-04 PROCEDURE — 700111 HCHG RX REV CODE 636 W/ 250 OVERRIDE (IP): Performed by: PHYSICIAN ASSISTANT

## 2023-12-04 PROCEDURE — 110454 HCHG SHELL REV 250: Performed by: NEUROLOGICAL SURGERY

## 2023-12-04 PROCEDURE — 700102 HCHG RX REV CODE 250 W/ 637 OVERRIDE(OP): Performed by: ANESTHESIOLOGY

## 2023-12-04 PROCEDURE — 700111 HCHG RX REV CODE 636 W/ 250 OVERRIDE (IP): Mod: JZ | Performed by: NEUROLOGICAL SURGERY

## 2023-12-04 PROCEDURE — 502000 HCHG MISC OR IMPLANTS RC 0278: Performed by: NEUROLOGICAL SURGERY

## 2023-12-04 PROCEDURE — 0ST20ZZ RESECTION OF LUMBAR VERTEBRAL DISC, OPEN APPROACH: ICD-10-PCS | Performed by: NEUROLOGICAL SURGERY

## 2023-12-04 PROCEDURE — 160002 HCHG RECOVERY MINUTES (STAT): Performed by: NEUROLOGICAL SURGERY

## 2023-12-04 PROCEDURE — 160042 HCHG SURGERY MINUTES - EA ADDL 1 MIN LEVEL 5: Performed by: NEUROLOGICAL SURGERY

## 2023-12-04 PROCEDURE — 0SG10A0 FUSION OF 2 OR MORE LUMBAR VERTEBRAL JOINTS WITH INTERBODY FUSION DEVICE, ANTERIOR APPROACH, ANTERIOR COLUMN, OPEN APPROACH: ICD-10-PCS | Performed by: NEUROLOGICAL SURGERY

## 2023-12-04 PROCEDURE — 700102 HCHG RX REV CODE 250 W/ 637 OVERRIDE(OP): Performed by: PHYSICIAN ASSISTANT

## 2023-12-04 PROCEDURE — 700105 HCHG RX REV CODE 258: Performed by: PHYSICIAN ASSISTANT

## 2023-12-04 PROCEDURE — 160036 HCHG PACU - EA ADDL 30 MINS PHASE I: Performed by: NEUROLOGICAL SURGERY

## 2023-12-04 PROCEDURE — 502240 HCHG MISC OR SUPPLY RC 0272: Performed by: NEUROLOGICAL SURGERY

## 2023-12-04 PROCEDURE — 160009 HCHG ANES TIME/MIN: Performed by: NEUROLOGICAL SURGERY

## 2023-12-04 DEVICE — MATRIX BONE 5CC ALPHAGRAFT CELLULAR (1EA): Type: IMPLANTABLE DEVICE | Site: SPINE LUMBAR | Status: FUNCTIONAL

## 2023-12-04 DEVICE — IMPLANTABLE DEVICE: Type: IMPLANTABLE DEVICE | Site: SPINE LUMBAR | Status: FUNCTIONAL

## 2023-12-04 RX ORDER — HYDROMORPHONE HYDROCHLORIDE 1 MG/ML
0.4 INJECTION, SOLUTION INTRAMUSCULAR; INTRAVENOUS; SUBCUTANEOUS
Status: DISCONTINUED | OUTPATIENT
Start: 2023-12-04 | End: 2023-12-04 | Stop reason: HOSPADM

## 2023-12-04 RX ORDER — ALBUTEROL SULFATE 2.5 MG/3ML
2.5 SOLUTION RESPIRATORY (INHALATION)
Status: DISCONTINUED | OUTPATIENT
Start: 2023-12-04 | End: 2023-12-04 | Stop reason: HOSPADM

## 2023-12-04 RX ORDER — OXYCODONE HYDROCHLORIDE 10 MG/1
10 TABLET ORAL EVERY 4 HOURS PRN
Status: DISCONTINUED | OUTPATIENT
Start: 2023-12-04 | End: 2023-12-10 | Stop reason: HOSPADM

## 2023-12-04 RX ORDER — EPHEDRINE SULFATE 50 MG/ML
5 INJECTION, SOLUTION INTRAVENOUS
Status: DISCONTINUED | OUTPATIENT
Start: 2023-12-04 | End: 2023-12-04 | Stop reason: HOSPADM

## 2023-12-04 RX ORDER — GABAPENTIN 300 MG/1
300 CAPSULE ORAL EVERY EVENING
Status: DISCONTINUED | OUTPATIENT
Start: 2023-12-04 | End: 2023-12-10 | Stop reason: HOSPADM

## 2023-12-04 RX ORDER — AMOXICILLIN 250 MG
1 CAPSULE ORAL
Status: DISCONTINUED | OUTPATIENT
Start: 2023-12-04 | End: 2023-12-07

## 2023-12-04 RX ORDER — HYDROCODONE BITARTRATE AND ACETAMINOPHEN 10; 325 MG/1; MG/1
1 TABLET ORAL EVERY 4 HOURS PRN
Status: DISCONTINUED | OUTPATIENT
Start: 2023-12-04 | End: 2023-12-06

## 2023-12-04 RX ORDER — REMIFENTANIL HYDROCHLORIDE 1 MG/ML
INJECTION, POWDER, LYOPHILIZED, FOR SOLUTION INTRAVENOUS
Status: DISCONTINUED | OUTPATIENT
Start: 2023-12-04 | End: 2023-12-04 | Stop reason: SURG

## 2023-12-04 RX ORDER — HYDROCODONE BITARTRATE AND ACETAMINOPHEN 5; 325 MG/1; MG/1
1 TABLET ORAL EVERY 4 HOURS PRN
Status: DISCONTINUED | OUTPATIENT
Start: 2023-12-04 | End: 2023-12-07

## 2023-12-04 RX ORDER — HALOPERIDOL 5 MG/ML
1 INJECTION INTRAMUSCULAR
Status: DISCONTINUED | OUTPATIENT
Start: 2023-12-04 | End: 2023-12-04 | Stop reason: HOSPADM

## 2023-12-04 RX ORDER — BUPIVACAINE HYDROCHLORIDE AND EPINEPHRINE 5; 5 MG/ML; UG/ML
INJECTION, SOLUTION EPIDURAL; INTRACAUDAL; PERINEURAL
Status: DISCONTINUED | OUTPATIENT
Start: 2023-12-04 | End: 2023-12-04 | Stop reason: HOSPADM

## 2023-12-04 RX ORDER — CEFAZOLIN SODIUM 1 G/3ML
INJECTION, POWDER, FOR SOLUTION INTRAMUSCULAR; INTRAVENOUS PRN
Status: DISCONTINUED | OUTPATIENT
Start: 2023-12-04 | End: 2023-12-04 | Stop reason: SURG

## 2023-12-04 RX ORDER — LIDOCAINE HYDROCHLORIDE 20 MG/ML
INJECTION, SOLUTION EPIDURAL; INFILTRATION; INTRACAUDAL; PERINEURAL PRN
Status: DISCONTINUED | OUTPATIENT
Start: 2023-12-04 | End: 2023-12-04 | Stop reason: SURG

## 2023-12-04 RX ORDER — CALCIUM CARBONATE 500 MG/1
500 TABLET, CHEWABLE ORAL 2 TIMES DAILY
Status: DISCONTINUED | OUTPATIENT
Start: 2023-12-04 | End: 2023-12-10 | Stop reason: HOSPADM

## 2023-12-04 RX ORDER — PROMETHAZINE HYDROCHLORIDE 25 MG/1
12.5-25 SUPPOSITORY RECTAL EVERY 4 HOURS PRN
Status: DISCONTINUED | OUTPATIENT
Start: 2023-12-04 | End: 2023-12-10 | Stop reason: HOSPADM

## 2023-12-04 RX ORDER — ONDANSETRON 4 MG/1
4 TABLET, ORALLY DISINTEGRATING ORAL EVERY 4 HOURS PRN
Status: DISCONTINUED | OUTPATIENT
Start: 2023-12-04 | End: 2023-12-10 | Stop reason: HOSPADM

## 2023-12-04 RX ORDER — MEPERIDINE HYDROCHLORIDE 25 MG/ML
6.25 INJECTION INTRAMUSCULAR; INTRAVENOUS; SUBCUTANEOUS
Status: DISCONTINUED | OUTPATIENT
Start: 2023-12-04 | End: 2023-12-04 | Stop reason: HOSPADM

## 2023-12-04 RX ORDER — ACETAMINOPHEN 500 MG
1000 TABLET ORAL ONCE
Status: COMPLETED | OUTPATIENT
Start: 2023-12-04 | End: 2023-12-04

## 2023-12-04 RX ORDER — MIDAZOLAM HYDROCHLORIDE 1 MG/ML
INJECTION INTRAMUSCULAR; INTRAVENOUS PRN
Status: DISCONTINUED | OUTPATIENT
Start: 2023-12-04 | End: 2023-12-04 | Stop reason: SURG

## 2023-12-04 RX ORDER — GABAPENTIN 300 MG/1
300 CAPSULE ORAL ONCE
Status: COMPLETED | OUTPATIENT
Start: 2023-12-04 | End: 2023-12-04

## 2023-12-04 RX ORDER — OXYCODONE HYDROCHLORIDE 5 MG/1
5 TABLET ORAL EVERY 4 HOURS PRN
Status: DISCONTINUED | OUTPATIENT
Start: 2023-12-04 | End: 2023-12-10 | Stop reason: HOSPADM

## 2023-12-04 RX ORDER — DOCUSATE SODIUM 100 MG/1
100 CAPSULE, LIQUID FILLED ORAL 2 TIMES DAILY
Status: DISCONTINUED | OUTPATIENT
Start: 2023-12-04 | End: 2023-12-10 | Stop reason: HOSPADM

## 2023-12-04 RX ORDER — AMOXICILLIN 250 MG
1 CAPSULE ORAL NIGHTLY
Status: DISCONTINUED | OUTPATIENT
Start: 2023-12-04 | End: 2023-12-07

## 2023-12-04 RX ORDER — PROCHLORPERAZINE EDISYLATE 5 MG/ML
5-10 INJECTION INTRAMUSCULAR; INTRAVENOUS EVERY 4 HOURS PRN
Status: DISCONTINUED | OUTPATIENT
Start: 2023-12-04 | End: 2023-12-10 | Stop reason: HOSPADM

## 2023-12-04 RX ORDER — POLYETHYLENE GLYCOL 3350 17 G/17G
1 POWDER, FOR SOLUTION ORAL 2 TIMES DAILY PRN
Status: DISCONTINUED | OUTPATIENT
Start: 2023-12-04 | End: 2023-12-07

## 2023-12-04 RX ORDER — DEXAMETHASONE SODIUM PHOSPHATE 4 MG/ML
INJECTION, SOLUTION INTRA-ARTICULAR; INTRALESIONAL; INTRAMUSCULAR; INTRAVENOUS; SOFT TISSUE PRN
Status: DISCONTINUED | OUTPATIENT
Start: 2023-12-04 | End: 2023-12-04 | Stop reason: SURG

## 2023-12-04 RX ORDER — OXYCODONE HCL 5 MG/5 ML
5 SOLUTION, ORAL ORAL
Status: COMPLETED | OUTPATIENT
Start: 2023-12-04 | End: 2023-12-04

## 2023-12-04 RX ORDER — DOCUSATE SODIUM 100 MG/1
100-200 CAPSULE, LIQUID FILLED ORAL 2 TIMES DAILY
Status: DISCONTINUED | OUTPATIENT
Start: 2023-12-04 | End: 2023-12-04

## 2023-12-04 RX ORDER — ENEMA 19; 7 G/133ML; G/133ML
1 ENEMA RECTAL
Status: DISCONTINUED | OUTPATIENT
Start: 2023-12-04 | End: 2023-12-07

## 2023-12-04 RX ORDER — HYDROMORPHONE HYDROCHLORIDE 1 MG/ML
0.2 INJECTION, SOLUTION INTRAMUSCULAR; INTRAVENOUS; SUBCUTANEOUS
Status: DISCONTINUED | OUTPATIENT
Start: 2023-12-04 | End: 2023-12-04 | Stop reason: HOSPADM

## 2023-12-04 RX ORDER — LABETALOL HYDROCHLORIDE 5 MG/ML
10 INJECTION, SOLUTION INTRAVENOUS
Status: DISCONTINUED | OUTPATIENT
Start: 2023-12-04 | End: 2023-12-10 | Stop reason: HOSPADM

## 2023-12-04 RX ORDER — PHENYLEPHRINE HCL IN 0.9% NACL 0.5 MG/5ML
SYRINGE (ML) INTRAVENOUS PRN
Status: DISCONTINUED | OUTPATIENT
Start: 2023-12-04 | End: 2023-12-04 | Stop reason: SURG

## 2023-12-04 RX ORDER — ONDANSETRON 2 MG/ML
INJECTION INTRAMUSCULAR; INTRAVENOUS PRN
Status: DISCONTINUED | OUTPATIENT
Start: 2023-12-04 | End: 2023-12-04 | Stop reason: SURG

## 2023-12-04 RX ORDER — METHOCARBAMOL 750 MG/1
750 TABLET, FILM COATED ORAL EVERY 8 HOURS PRN
Status: DISCONTINUED | OUTPATIENT
Start: 2023-12-04 | End: 2023-12-06

## 2023-12-04 RX ORDER — SODIUM CHLORIDE, SODIUM LACTATE, POTASSIUM CHLORIDE, AND CALCIUM CHLORIDE .6; .31; .03; .02 G/100ML; G/100ML; G/100ML; G/100ML
IRRIGANT IRRIGATION
Status: DISCONTINUED | OUTPATIENT
Start: 2023-12-04 | End: 2023-12-04 | Stop reason: HOSPADM

## 2023-12-04 RX ORDER — CEFAZOLIN SODIUM 1 G/3ML
INJECTION, POWDER, FOR SOLUTION INTRAMUSCULAR; INTRAVENOUS
Status: DISCONTINUED | OUTPATIENT
Start: 2023-12-04 | End: 2023-12-04 | Stop reason: HOSPADM

## 2023-12-04 RX ORDER — DOCUSATE SODIUM 100 MG/1
100-200 CAPSULE, LIQUID FILLED ORAL 2 TIMES DAILY
COMMUNITY

## 2023-12-04 RX ORDER — ONDANSETRON 2 MG/ML
4 INJECTION INTRAMUSCULAR; INTRAVENOUS
Status: DISCONTINUED | OUTPATIENT
Start: 2023-12-04 | End: 2023-12-04 | Stop reason: HOSPADM

## 2023-12-04 RX ORDER — LABETALOL HYDROCHLORIDE 5 MG/ML
5 INJECTION, SOLUTION INTRAVENOUS
Status: DISCONTINUED | OUTPATIENT
Start: 2023-12-04 | End: 2023-12-04 | Stop reason: HOSPADM

## 2023-12-04 RX ORDER — HYDROMORPHONE HYDROCHLORIDE 1 MG/ML
0.1 INJECTION, SOLUTION INTRAMUSCULAR; INTRAVENOUS; SUBCUTANEOUS
Status: DISCONTINUED | OUTPATIENT
Start: 2023-12-04 | End: 2023-12-04 | Stop reason: HOSPADM

## 2023-12-04 RX ORDER — DIPHENHYDRAMINE HYDROCHLORIDE 50 MG/ML
12.5 INJECTION INTRAMUSCULAR; INTRAVENOUS
Status: DISCONTINUED | OUTPATIENT
Start: 2023-12-04 | End: 2023-12-04 | Stop reason: HOSPADM

## 2023-12-04 RX ORDER — ENOXAPARIN SODIUM 100 MG/ML
40 INJECTION SUBCUTANEOUS DAILY
Status: DISCONTINUED | OUTPATIENT
Start: 2023-12-05 | End: 2023-12-05

## 2023-12-04 RX ORDER — ONDANSETRON 2 MG/ML
4 INJECTION INTRAMUSCULAR; INTRAVENOUS EVERY 4 HOURS PRN
Status: DISCONTINUED | OUTPATIENT
Start: 2023-12-04 | End: 2023-12-10 | Stop reason: HOSPADM

## 2023-12-04 RX ORDER — PROMETHAZINE HYDROCHLORIDE 25 MG/1
12.5-25 TABLET ORAL EVERY 4 HOURS PRN
Status: DISCONTINUED | OUTPATIENT
Start: 2023-12-04 | End: 2023-12-10 | Stop reason: HOSPADM

## 2023-12-04 RX ORDER — HYDRALAZINE HYDROCHLORIDE 20 MG/ML
5 INJECTION INTRAMUSCULAR; INTRAVENOUS
Status: DISCONTINUED | OUTPATIENT
Start: 2023-12-04 | End: 2023-12-04 | Stop reason: HOSPADM

## 2023-12-04 RX ORDER — ACETAMINOPHEN 325 MG/1
650 TABLET ORAL EVERY 4 HOURS PRN
Status: DISCONTINUED | OUTPATIENT
Start: 2023-12-04 | End: 2023-12-10 | Stop reason: HOSPADM

## 2023-12-04 RX ORDER — OXYCODONE HCL 5 MG/5 ML
10 SOLUTION, ORAL ORAL
Status: COMPLETED | OUTPATIENT
Start: 2023-12-04 | End: 2023-12-04

## 2023-12-04 RX ORDER — SODIUM CHLORIDE, SODIUM LACTATE, POTASSIUM CHLORIDE, CALCIUM CHLORIDE 600; 310; 30; 20 MG/100ML; MG/100ML; MG/100ML; MG/100ML
INJECTION, SOLUTION INTRAVENOUS CONTINUOUS
Status: ACTIVE | OUTPATIENT
Start: 2023-12-04 | End: 2023-12-04

## 2023-12-04 RX ORDER — HYDROMORPHONE HYDROCHLORIDE 1 MG/ML
0.5 INJECTION, SOLUTION INTRAMUSCULAR; INTRAVENOUS; SUBCUTANEOUS
Status: DISCONTINUED | OUTPATIENT
Start: 2023-12-04 | End: 2023-12-10 | Stop reason: HOSPADM

## 2023-12-04 RX ORDER — SODIUM CHLORIDE 9 MG/ML
INJECTION, SOLUTION INTRAVENOUS CONTINUOUS
Status: DISCONTINUED | OUTPATIENT
Start: 2023-12-04 | End: 2023-12-10 | Stop reason: HOSPADM

## 2023-12-04 RX ORDER — CELECOXIB 200 MG/1
200 CAPSULE ORAL ONCE
Status: COMPLETED | OUTPATIENT
Start: 2023-12-04 | End: 2023-12-04

## 2023-12-04 RX ORDER — ROCURONIUM BROMIDE 10 MG/ML
INJECTION, SOLUTION INTRAVENOUS PRN
Status: DISCONTINUED | OUTPATIENT
Start: 2023-12-04 | End: 2023-12-04 | Stop reason: SURG

## 2023-12-04 RX ORDER — GABAPENTIN 300 MG/1
300 CAPSULE ORAL EVERY EVENING
COMMUNITY

## 2023-12-04 RX ORDER — BISACODYL 10 MG
10 SUPPOSITORY, RECTAL RECTAL
Status: DISCONTINUED | OUTPATIENT
Start: 2023-12-04 | End: 2023-12-07

## 2023-12-04 RX ORDER — MIDAZOLAM HYDROCHLORIDE 1 MG/ML
1 INJECTION INTRAMUSCULAR; INTRAVENOUS
Status: DISCONTINUED | OUTPATIENT
Start: 2023-12-04 | End: 2023-12-04 | Stop reason: HOSPADM

## 2023-12-04 RX ADMIN — METHOCARBAMOL 1000 MG: 100 INJECTION, SOLUTION INTRAMUSCULAR; INTRAVENOUS at 14:32

## 2023-12-04 RX ADMIN — Medication 200 MCG: at 11:31

## 2023-12-04 RX ADMIN — SODIUM CHLORIDE, POTASSIUM CHLORIDE, SODIUM LACTATE AND CALCIUM CHLORIDE: 600; 310; 30; 20 INJECTION, SOLUTION INTRAVENOUS at 10:46

## 2023-12-04 RX ADMIN — SODIUM CHLORIDE: 9 INJECTION, SOLUTION INTRAVENOUS at 20:30

## 2023-12-04 RX ADMIN — GABAPENTIN 300 MG: 300 CAPSULE ORAL at 10:15

## 2023-12-04 RX ADMIN — ANTACID TABLETS 500 MG: 500 TABLET, CHEWABLE ORAL at 20:35

## 2023-12-04 RX ADMIN — CEFAZOLIN 2 G: 2 INJECTION, POWDER, FOR SOLUTION INTRAMUSCULAR; INTRAVENOUS at 20:35

## 2023-12-04 RX ADMIN — HYDROCODONE BITARTRATE AND ACETAMINOPHEN 1 TABLET: 5; 325 TABLET ORAL at 19:58

## 2023-12-04 RX ADMIN — PROPOFOL 200 MG: 10 INJECTION, EMULSION INTRAVENOUS at 10:51

## 2023-12-04 RX ADMIN — FENTANYL CITRATE 100 MCG: 50 INJECTION, SOLUTION INTRAMUSCULAR; INTRAVENOUS at 10:51

## 2023-12-04 RX ADMIN — DEXAMETHASONE SODIUM PHOSPHATE 10 MG: 4 INJECTION INTRA-ARTICULAR; INTRALESIONAL; INTRAMUSCULAR; INTRAVENOUS; SOFT TISSUE at 10:56

## 2023-12-04 RX ADMIN — SUGAMMADEX 200 MG: 100 INJECTION, SOLUTION INTRAVENOUS at 13:35

## 2023-12-04 RX ADMIN — DOCUSATE SODIUM 50 MG AND SENNOSIDES 8.6 MG 1 TABLET: 8.6; 5 TABLET, FILM COATED ORAL at 20:36

## 2023-12-04 RX ADMIN — DOCUSATE SODIUM 50 MG AND SENNOSIDES 8.6 MG 1 TABLET: 8.6; 5 TABLET, FILM COATED ORAL at 20:37

## 2023-12-04 RX ADMIN — REMIFENTANIL HYDROCHLORIDE 0.2 MCG/KG/MIN: 1 INJECTION, POWDER, LYOPHILIZED, FOR SOLUTION INTRAVENOUS at 10:56

## 2023-12-04 RX ADMIN — ONDANSETRON 4 MG: 2 INJECTION INTRAMUSCULAR; INTRAVENOUS at 13:15

## 2023-12-04 RX ADMIN — DOCUSATE SODIUM 100 MG: 100 CAPSULE, LIQUID FILLED ORAL at 20:15

## 2023-12-04 RX ADMIN — METHOCARBAMOL 1000 MG: 100 INJECTION, SOLUTION INTRAMUSCULAR; INTRAVENOUS at 22:26

## 2023-12-04 RX ADMIN — MIDAZOLAM HYDROCHLORIDE 2 MG: 1 INJECTION, SOLUTION INTRAMUSCULAR; INTRAVENOUS at 10:46

## 2023-12-04 RX ADMIN — Medication 200 MCG: at 11:48

## 2023-12-04 RX ADMIN — HYDROMORPHONE HYDROCHLORIDE 0.4 MG: 1 INJECTION, SOLUTION INTRAMUSCULAR; INTRAVENOUS; SUBCUTANEOUS at 14:54

## 2023-12-04 RX ADMIN — Medication 200 MCG: at 11:39

## 2023-12-04 RX ADMIN — ACETAMINOPHEN 1000 MG: 500 TABLET ORAL at 10:15

## 2023-12-04 RX ADMIN — SODIUM CHLORIDE, POTASSIUM CHLORIDE, SODIUM LACTATE AND CALCIUM CHLORIDE: 600; 310; 30; 20 INJECTION, SOLUTION INTRAVENOUS at 11:00

## 2023-12-04 RX ADMIN — OXYCODONE HYDROCHLORIDE 10 MG: 5 SOLUTION ORAL at 14:38

## 2023-12-04 RX ADMIN — ROCURONIUM BROMIDE 50 MG: 50 INJECTION, SOLUTION INTRAVENOUS at 10:51

## 2023-12-04 RX ADMIN — Medication 200 MCG: at 11:28

## 2023-12-04 RX ADMIN — Medication 200 MCG: at 11:35

## 2023-12-04 RX ADMIN — CEFAZOLIN 3 G: 1 INJECTION, POWDER, FOR SOLUTION INTRAMUSCULAR; INTRAVENOUS at 11:25

## 2023-12-04 RX ADMIN — PROPOFOL 150 MCG/KG/MIN: 10 INJECTION, EMULSION INTRAVENOUS at 10:56

## 2023-12-04 RX ADMIN — FENTANYL CITRATE 50 MCG: 50 INJECTION, SOLUTION INTRAMUSCULAR; INTRAVENOUS at 13:24

## 2023-12-04 RX ADMIN — LIDOCAINE HYDROCHLORIDE 100 MG: 20 INJECTION, SOLUTION EPIDURAL; INFILTRATION; INTRACAUDAL at 10:51

## 2023-12-04 RX ADMIN — CELECOXIB 200 MG: 200 CAPSULE ORAL at 10:15

## 2023-12-04 ASSESSMENT — PAIN SCALES - GENERAL: PAIN_LEVEL: 3

## 2023-12-04 ASSESSMENT — PAIN DESCRIPTION - PAIN TYPE
TYPE: ACUTE PAIN;SURGICAL PAIN
TYPE: SURGICAL PAIN

## 2023-12-04 ASSESSMENT — FIBROSIS 4 INDEX: FIB4 SCORE: 1.09

## 2023-12-04 NOTE — ANESTHESIA PROCEDURE NOTES
Peripheral IV    Date/Time: 12/4/2023 10:56 AM    Performed by: Joaquina Segal M.D.  Authorized by: Joaquina Segal M.D.    Size:  18 G  Laterality:  Left  Peripheral IV Location:  Hand  Site Prep:  Alcohol  Technique:  Direct puncture  Attempts:  1

## 2023-12-04 NOTE — OR SURGEON
Immediate Post OP Note    PreOp Diagnosis: lumbar spondylosis      PostOp Diagnosis: same      Procedure(s):  STAGE #1 LUMBAR 2-3, 3-4 EXTREME LATERAL INTERBODY FUSION - Wound Class: Clean    Surgeon(s):  Luis Puentes III, M.D.    Anesthesiologist/Type of Anesthesia:  Anesthesiologist: Joaquina Segal M.D./General    Surgical Staff:  Circulator: Carlos Edouard R.N.  Relief Circulator: Barbara Barragan R.N.  Relief Scrub: Candelario Watson  Scrub Person: Noe Murcia; Jake Navarro  Radiology Technologist: Marianne Jacobson    Specimens removed if any:  * No specimens in log *    Estimated Blood Loss: minimal    Findings: lumbar spondylosis, went well     Complications: none        12/4/2023 1:36 PM Sammi Wylie P.A.-C.

## 2023-12-04 NOTE — ANESTHESIA POSTPROCEDURE EVALUATION
Patient: Quan Quesada    Procedure Summary       Date: 12/04/23 Room / Location: [facility] 05 / SURGERY [facility]    Anesthesia Start: 1046 Anesthesia Stop: 1352    Procedure: STAGE #1 LUMBAR 2-3, 3-4 EXTREME LATERAL INTERBODY FUSION (Left: Spine Lumbar) Diagnosis: (LUMBAR STENOSIS, SPONDYLOSIS)    Surgeons: Luis Puentes III, M.D. Responsible Provider: Joaquina Segal M.D.    Anesthesia Type: general ASA Status: 2            Final Anesthesia Type: general  Last vitals  BP   Blood Pressure: 112/67    Temp   36.1 °C (97 °F)    Pulse   85   Resp   16    SpO2   93 %      Anesthesia Post Evaluation    Patient location during evaluation: PACU  Patient participation: complete - patient participated  Level of consciousness: confused  Pain score: 3    Airway patency: patent  Anesthetic complications: no  Cardiovascular status: adequate  Respiratory status: acceptable  Hydration status: acceptable    PONV: none          There were no known notable events for this encounter.

## 2023-12-04 NOTE — ANESTHESIA TIME REPORT
Anesthesia Start and Stop Event Times       Date Time Event    12/4/2023 1035 Ready for Procedure     1046 Anesthesia Start     1352 Anesthesia Stop          Responsible Staff  12/04/23      Name Role Begin End    Joaquina Segal M.D. Anesth 1046 1352          Overtime Reason:  no overtime (within assigned shift)    Comments:

## 2023-12-04 NOTE — ANESTHESIA PROCEDURE NOTES
Airway    Date/Time: 12/4/2023 10:52 AM    Performed by: Joaquina Segal M.D.  Authorized by: Joaquina Segal M.D.    Location:  OR  Urgency:  Elective  Indications for Airway Management:  Anesthesia      Spontaneous Ventilation: absent    Sedation Level:  Deep  Preoxygenated: Yes    Patient Position:  Sniffing and reverse Trendelenburg  Mask Difficulty Assessment:  2 - vent by mask + OA or adjuvant +/- NMBA  Final Airway Type:  Endotracheal airway  Final Endotracheal Airway:  ETT  Cuffed: Yes    Technique Used for Successful ETT Placement:  Direct laryngoscopy    Insertion Site:  Oral  Blade Type:  Cindy  Laryngoscope Blade/Videolaryngoscope Blade Size:  4  ETT Size (mm):  7.0  Measured from:  Teeth  ETT to Teeth (cm):  22  Placement Verified by: auscultation and capnometry    Cormack-Lehane Classification:  Grade IIa - partial view of glottis  Number of Attempts at Approach:  1

## 2023-12-04 NOTE — ANESTHESIA PREPROCEDURE EVALUATION
Case: [id number] Date/Time: 12/04/23 1045    Procedure: STAGE #1 LUMBAR 2-3, 3-4 EXTREME LATERAL INTERBODY FUSION    Pre-op diagnosis: LUMBAR STENOSIS, SPONDYLOSIS    [facility]  / [facility]    Surgeons: Luis Puentes III, M.D.            Relevant Problems   No relevant active problems       Physical Exam    Airway   Mallampati: II  TM distance: >3 FB  Neck ROM: full       Cardiovascular - normal exam     Dental - normal exam           Pulmonary   Breath sounds clear to auscultation     Abdominal   (+) obese     Neurological - normal exam                   Anesthesia Plan    ASA 2       Plan - general       Airway plan will be ETT          Induction: intravenous    Postoperative Plan: Postoperative administration of opioids is intended.    Pertinent diagnostic labs and testing reviewed    Informed Consent:    Anesthetic plan and risks discussed with patient.

## 2023-12-04 NOTE — PROGRESS NOTES
Med Rec complete per PY  Allergies Reviewed    Pt reports NOT taking anticoagulant in the last 14 days

## 2023-12-05 LAB
ANION GAP SERPL CALC-SCNC: 10 MMOL/L (ref 7–16)
BASOPHILS # BLD AUTO: 0.2 % (ref 0–1.8)
BASOPHILS # BLD: 0.02 K/UL (ref 0–0.12)
BUN SERPL-MCNC: 15 MG/DL (ref 8–22)
CALCIUM SERPL-MCNC: 8.4 MG/DL (ref 8.5–10.5)
CHLORIDE SERPL-SCNC: 104 MMOL/L (ref 96–112)
CO2 SERPL-SCNC: 26 MMOL/L (ref 20–33)
CREAT SERPL-MCNC: 0.74 MG/DL (ref 0.5–1.4)
EOSINOPHIL # BLD AUTO: 0 K/UL (ref 0–0.51)
EOSINOPHIL NFR BLD: 0 % (ref 0–6.9)
ERYTHROCYTE [DISTWIDTH] IN BLOOD BY AUTOMATED COUNT: 47.1 FL (ref 35.9–50)
GFR SERPLBLD CREATININE-BSD FMLA CKD-EPI: 105 ML/MIN/1.73 M 2
GLUCOSE SERPL-MCNC: 149 MG/DL (ref 65–99)
HCT VFR BLD AUTO: 45.8 % (ref 42–52)
HGB BLD-MCNC: 15 G/DL (ref 14–18)
IMM GRANULOCYTES # BLD AUTO: 0.04 K/UL (ref 0–0.11)
IMM GRANULOCYTES NFR BLD AUTO: 0.3 % (ref 0–0.9)
LYMPHOCYTES # BLD AUTO: 1.06 K/UL (ref 1–4.8)
LYMPHOCYTES NFR BLD: 9.2 % (ref 22–41)
MCH RBC QN AUTO: 31.4 PG (ref 27–33)
MCHC RBC AUTO-ENTMCNC: 32.8 G/DL (ref 32.3–36.5)
MCV RBC AUTO: 95.8 FL (ref 81.4–97.8)
MONOCYTES # BLD AUTO: 1.03 K/UL (ref 0–0.85)
MONOCYTES NFR BLD AUTO: 8.9 % (ref 0–13.4)
NEUTROPHILS # BLD AUTO: 9.41 K/UL (ref 1.82–7.42)
NEUTROPHILS NFR BLD: 81.4 % (ref 44–72)
NRBC # BLD AUTO: 0 K/UL
NRBC BLD-RTO: 0 /100 WBC (ref 0–0.2)
PLATELET # BLD AUTO: 208 K/UL (ref 164–446)
PMV BLD AUTO: 9.7 FL (ref 9–12.9)
POTASSIUM SERPL-SCNC: 4.5 MMOL/L (ref 3.6–5.5)
RBC # BLD AUTO: 4.78 M/UL (ref 4.7–6.1)
SODIUM SERPL-SCNC: 140 MMOL/L (ref 135–145)
WBC # BLD AUTO: 11.6 K/UL (ref 4.8–10.8)

## 2023-12-05 PROCEDURE — 700111 HCHG RX REV CODE 636 W/ 250 OVERRIDE (IP): Performed by: PHYSICIAN ASSISTANT

## 2023-12-05 PROCEDURE — 97535 SELF CARE MNGMENT TRAINING: CPT

## 2023-12-05 PROCEDURE — 80048 BASIC METABOLIC PNL TOTAL CA: CPT

## 2023-12-05 PROCEDURE — 700111 HCHG RX REV CODE 636 W/ 250 OVERRIDE (IP): Mod: JZ | Performed by: PHYSICIAN ASSISTANT

## 2023-12-05 PROCEDURE — 770001 HCHG ROOM/CARE - MED/SURG/GYN PRIV*

## 2023-12-05 PROCEDURE — 700105 HCHG RX REV CODE 258: Performed by: PHYSICIAN ASSISTANT

## 2023-12-05 PROCEDURE — 97166 OT EVAL MOD COMPLEX 45 MIN: CPT

## 2023-12-05 PROCEDURE — 700102 HCHG RX REV CODE 250 W/ 637 OVERRIDE(OP): Performed by: PHYSICIAN ASSISTANT

## 2023-12-05 PROCEDURE — 85025 COMPLETE CBC W/AUTO DIFF WBC: CPT

## 2023-12-05 PROCEDURE — 36415 COLL VENOUS BLD VENIPUNCTURE: CPT

## 2023-12-05 PROCEDURE — A9270 NON-COVERED ITEM OR SERVICE: HCPCS | Performed by: PHYSICIAN ASSISTANT

## 2023-12-05 RX ORDER — ENOXAPARIN SODIUM 100 MG/ML
40 INJECTION SUBCUTANEOUS DAILY
Status: COMPLETED | OUTPATIENT
Start: 2023-12-05 | End: 2023-12-05

## 2023-12-05 RX ADMIN — DOCUSATE SODIUM 100 MG: 100 CAPSULE, LIQUID FILLED ORAL at 17:38

## 2023-12-05 RX ADMIN — DOCUSATE SODIUM 100 MG: 100 CAPSULE, LIQUID FILLED ORAL at 04:14

## 2023-12-05 RX ADMIN — GABAPENTIN 300 MG: 300 CAPSULE ORAL at 17:38

## 2023-12-05 RX ADMIN — METHOCARBAMOL 750 MG: 750 TABLET ORAL at 21:14

## 2023-12-05 RX ADMIN — HYDROCODONE BITARTRATE AND ACETAMINOPHEN 1 TABLET: 5; 325 TABLET ORAL at 23:46

## 2023-12-05 RX ADMIN — CHOLECALCIFEROL TAB 125 MCG (5000 UNIT) 5000 UNITS: 125 TAB at 05:32

## 2023-12-05 RX ADMIN — ANTACID TABLETS 500 MG: 500 TABLET, CHEWABLE ORAL at 17:38

## 2023-12-05 RX ADMIN — ENOXAPARIN SODIUM 40 MG: 100 INJECTION SUBCUTANEOUS at 15:15

## 2023-12-05 RX ADMIN — DOCUSATE SODIUM 50 MG AND SENNOSIDES 8.6 MG 1 TABLET: 8.6; 5 TABLET, FILM COATED ORAL at 21:00

## 2023-12-05 RX ADMIN — CEFAZOLIN 2 G: 2 INJECTION, POWDER, FOR SOLUTION INTRAMUSCULAR; INTRAVENOUS at 04:17

## 2023-12-05 ASSESSMENT — PAIN DESCRIPTION - PAIN TYPE
TYPE: ACUTE PAIN;SURGICAL PAIN
TYPE: SURGICAL PAIN
TYPE: ACUTE PAIN;SURGICAL PAIN
TYPE: SURGICAL PAIN
TYPE: ACUTE PAIN;SURGICAL PAIN

## 2023-12-05 ASSESSMENT — COGNITIVE AND FUNCTIONAL STATUS - GENERAL
DRESSING REGULAR UPPER BODY CLOTHING: A LITTLE
TOILETING: A LITTLE
MOBILITY SCORE: 20
DAILY ACTIVITIY SCORE: 20
WALKING IN HOSPITAL ROOM: A LITTLE
DAILY ACTIVITIY SCORE: 20
DRESSING REGULAR LOWER BODY CLOTHING: A LITTLE
CLIMB 3 TO 5 STEPS WITH RAILING: A LOT
SUGGESTED CMS G CODE MODIFIER DAILY ACTIVITY: CJ
HELP NEEDED FOR BATHING: A LITTLE
SUGGESTED CMS G CODE MODIFIER MOBILITY: CJ
SUGGESTED CMS G CODE MODIFIER DAILY ACTIVITY: CJ
STANDING UP FROM CHAIR USING ARMS: A LITTLE
PERSONAL GROOMING: A LITTLE
HELP NEEDED FOR BATHING: A LITTLE
DRESSING REGULAR LOWER BODY CLOTHING: A LOT

## 2023-12-05 ASSESSMENT — ACTIVITIES OF DAILY LIVING (ADL): TOILETING: INDEPENDENT

## 2023-12-05 NOTE — DISCHARGE PLANNING
"RN CM met with patient at bedside to complete admission assessment. Patient A&Ox4 and able to verify information on face sheet.   Patient lives alone in single story home in [facility].   Prior to this admission patient was independent with ADLs and IADLs. He owns a personal vehicle. He is not currently employed but reports no financial concerns as he is living on a \"big savings\".   His emergency contact is his uncle, lizeth [phone]. He has no children and has limited support in the community.   He reports having substance abuse issues when he was young but has not abused substances in a \"long time\". He does not have a history of mental health disorders and is not on psychotropic medications.   Patient owns a walker, cane and wheelchair which he does not use but has kept on hand in case of needing them.  Pending PT/OT recommendations post-op.   RN CM to continue following patient's case to assist with discharge needs.     Case Management Discharge Planning    Admission Date: 12/4/2023  GMLOS: 3  ALOS: 1    6-Clicks ADL Score: 20  6-Clicks Mobility Score: 20      Anticipated Discharge Dispo: Discharge Disposition: Discharged to home/self care (01)  Discharge Address: [address] [place] [id number]  Discharge Contact Phone Number: [phone]    DME Needed: No    Action(s) Taken: Patient Conference    Escalations Completed: None    Medically Clear: No    Next Steps: Pending PT/OT recommendations Post-op    Barriers to Discharge: Medical clearance and Pending PT Evaluation    Care Transition Team Assessment    Information Source  Orientation Level: Oriented X4  Information Given By: Patient  Informant's Name: Quan  Who is responsible for making decisions for patient? : Patient    Readmission Evaluation  Is this a readmission?: No    Elopement Risk  Legal Hold: No  Ambulatory or Self Mobile in Wheelchair: Yes  Disoriented: No  Psychiatric Symptoms: None  History of Wandering: No  Elopement this Admit: " No  Vocalizing Wanting to Leave: No  Displays Behaviors, Body Language Wanting to Leave: No-Not at Risk for Elopement  Elopement Risk: Not at Risk for Elopement         Discharge Preparedness  What is your plan after discharge?: Home with help  What are your discharge supports?: Other (comment) (Uncle)  Prior Functional Level: Ambulatory, Independent with Activities of Daily Living, Independent with Medication Management  Difficulity with ADLs: None  Difficulity with IADLs: None    Functional Assesment  Prior Functional Level: Ambulatory, Independent with Activities of Daily Living, Independent with Medication Management    Finances  Financial Barriers to Discharge: No  Prescription Coverage: Yes    Vision / Hearing Impairment  Right Eye Vision: Impaired, Wears Glasses  Left Eye Vision: Impaired, Wears Glasses         Advance Directive  Advance Directive?: None    Domestic Abuse  Physical Abuse or Sexual Abuse: No  Verbal Abuse or Emotional Abuse: No    Psychological Assessment  History of Substance Abuse: None  History of Psychiatric Problems: No  Non-compliant with Treatment: No  Newly Diagnosed Illness: Yes    Discharge Risks or Barriers  Discharge risks or barriers?: Lives alone, no community support  Patient risk factors: Lives alone and no community support    Anticipated Discharge Information  Discharge Disposition: Discharged to home/self care (01)  Discharge Address: [address] [place], [place] [address] [address]  Discharge Contact Phone Number: [phone]

## 2023-12-05 NOTE — PROGRESS NOTES
4 Eyes Skin Assessment Completed by LETICIA Collins and LETICIA Cain.    Head WDL  Ears WDL  Nose WDL  Mouth WDL  Neck WDL  Breast/Chest WDL  Shoulder Blades WDL  Spine WDL  (R) Arm/Elbow/Hand WDL  (L) Arm/Elbow/Hand WDL  Abdomen WDL  Groin WDL  Scrotum/Coccyx/Buttocks WDL  (R) Leg Incision  (L) Leg WDL  (R) Heel/Foot/Toe WDL  (L) Heel/Foot/Toe WDL          Devices In Places Blood Pressure Cuff, Pulse Ox, and Nasal Cannula      Interventions In Place Gray Ear Foams and Pillows    Possible Skin Injury No    Pictures Uploaded Into Epic N/A  Wound Consult Placed N/A  RN Wound Prevention Protocol Ordered No

## 2023-12-05 NOTE — PROGRESS NOTES
Neurosurgery Progress Note    Subjective:  POD#1 stage I L2-4 XLIF  Stage II Wednesday L2-5 laminectomy, L5-S1 TLIF, and stealth L2-S1 PSF  Doing well today, tolerable pain      Exam:  Pleasant and cooperative  Dressing c/d/I  BLE strength 5/5 throughout  Sensation intact throughout      BP  Min: 109/55  Max: 156/83  Pulse  Av.8  Min: 82  Max: 104  Resp  Avg: 15.5  Min: 10  Max: 21  Temp  Av.6 °C (97.8 °F)  Min: 36.1 °C (97 °F)  Max: 37 °C (98.6 °F)  SpO2  Av.1 %  Min: 79 %  Max: 97 %    No data recorded    Recent Labs     23  0503   WBC 11.6*   RBC 4.78   HEMOGLOBIN 15.0   HEMATOCRIT 45.8   MCV 95.8   MCH 31.4   MCHC 32.8   RDW 47.1   PLATELETCT 208   MPV 9.7     Recent Labs     23  0503   SODIUM 140   POTASSIUM 4.5   CHLORIDE 104   CO2 26   GLUCOSE 149*   BUN 15   CREATININE 0.74   CALCIUM 8.4*               Intake/Output                         23 0700 - 23 0659 23 0700 - 23 0659      0422-1004 Total [id number] 7608-5426 Total                 Intake    P.O.  --  -- --  120  -- 120    P.O. -- -- -- 120 -- 120    I.V.  1  -- 1  179.5  -- 179.5    Volume (mL) (lactated ringers infusion) 1 -- 1 179.5 -- 179.5    IV Piggyback  --  -- --  195.5  -- 195.5    Volume (mL) (ceFAZolin (Ancef) 2 g in  mL IVPB) -- -- -- 195.5 -- 195.5    Total Intake 1 -- 1 495 -- 495       Output    Urine  125  740 865  1075  -- 1075    Urine 125 -- 125 -- -- --    Number of Times Voided -- 1 x 1 x 1 x -- 1 x    Urine Void (mL) --  -- 1075    Total Output 125  -- 1075       Net I/O     -124 -740 -864 -580 -- -580              Intake/Output Summary (Last 24 hours) at 2023 1259  Last data filed at 2023 1030  Gross per 24 hour   Intake 495.96 ml   Output 1940 ml   Net -1444.04 ml             gabapentin  300 mg Q EVENING    Pharmacy Consult Request  1 Each PHARMACY TO DOSE    MD ALERT...DO NOT ADMINISTER NSAIDS or ASPIRIN unless ORDERED By  Neurosurgery  1 Each PRN    docusate sodium  100 mg BID    senna-docusate  1 Tablet Nightly    senna-docusate  1 Tablet Q24HRS PRN    polyethylene glycol/lytes  1 Packet BID PRN    magnesium hydroxide  30 mL QDAY PRN    bisacodyl  10 mg Q24HRS PRN    sodium phosphate  1 Each Once PRN    NS   Continuous    enoxaparin (LOVENOX) injection  40 mg DAILY AT 1800    acetaminophen  650 mg Q4HRS PRN    HYDROcodone-acetaminophen  1 Tablet Q4HRS PRN    HYDROcodone/acetaminophen  1 Tablet Q4HRS PRN    oxyCODONE immediate-release  5 mg Q4HRS PRN    Or    oxyCODONE immediate-release  10 mg Q4HRS PRN    HYDROmorphone  0.5 mg Q3HRS PRN    ondansetron  4 mg Q4HRS PRN    ondansetron  4 mg Q4HRS PRN    promethazine  12.5-25 mg Q4HRS PRN    promethazine  12.5-25 mg Q4HRS PRN    prochlorperazine  5-10 mg Q4HRS PRN    methocarbamol  750 mg Q8HRS PRN    labetalol  10 mg Q HOUR PRN    benzocaine-menthol  1 Lozenge Q2HRS PRN    calcium carbonate  500 mg BID    vitamin D3  5,000 Units DAILY       Assessment and Plan:  Hospital day # 2  POD# 1  Stage 2 surgery Wednesday  NPO at midnight    Chemical prophylactic DVT therapy: No  Start date/time: 12/7

## 2023-12-05 NOTE — THERAPY
Physical Therapy Contact Note    Patient Name: Quan Quesada  Age:  58 y.o., Sex:  male  Medical Record #: [id number]  Today's Date: 12/5/2023    PT consult received, hold PT eval until post-op stage 2 spinal sx tomorrow 12/6.

## 2023-12-05 NOTE — CARE PLAN
Problem: Pain - Standard  Goal: Alleviation of pain or a reduction in pain to the patient’s comfort goal  Outcome: Progressing     Problem: Knowledge Deficit - Standard  Goal: Patient and family/care givers will demonstrate understanding of plan of care, disease process/condition, diagnostic tests and medications  Outcome: Progressing     Problem: Fall Risk  Goal: Patient will remain free from falls  Outcome: Progressing   The patient is Stable - Low risk of patient condition declining or worsening    Shift Goals  Clinical Goals: Pain management, post op vitals, food  Patient Goals: Food  Family Goals: Food    Progress made toward(s) clinical / shift goals:      Patient is not progressing towards the following goals:

## 2023-12-05 NOTE — CARE PLAN
Problem: Pain - Standard  Goal: Alleviation of pain or a reduction in pain to the patient’s comfort goal  Outcome: Progressing     Problem: Knowledge Deficit - Standard  Goal: Patient and family/care givers will demonstrate understanding of plan of care, disease process/condition, diagnostic tests and medications  Outcome: Progressing     Problem: Fall Risk  Goal: Patient will remain free from falls  Outcome: Progressing   The patient is Stable - Low risk of patient condition declining or worsening    Shift Goals  Clinical Goals: Pain management, post op vitals, food  Patient Goals: Food  Family Goals: Food    Progress made toward(s) clinical / shift goals:  Pain management, rest, ambulation.     Patient is not progressing towards the following goals:

## 2023-12-05 NOTE — OP REPORT
DATE OF SERVICE:  12/04/2023     PREOPERATIVE DIAGNOSES:  1.  Lumbar spondylosis.  2.  Lumbar stenosis.  3.  Lumbar spondylolisthesis.  4.  Lumbar radiculopathy.  5.  Adult degenerative scoliosis.     POSTOPERATIVE DIAGNOSES:  1.  Lumbar spondylosis.  2.  Lumbar stenosis.  3.  Lumbar spondylolisthesis.  4.  Lumbar radiculopathy.  5.  Adult degenerative scoliosis.     PROCEDURES PERFORMED:  1.  Left lateral L3-L4 retroperitoneal approach to the spine.  2.  L3-L4 lateral diskectomy and titanium interbody cage placement.  3.  L3-L4 interbody/allograft lateral fusion.  4.  L3-L4 lateral plating fixation.  5.  Scoliosis correction, 59 modifier.  6.  Degenerative scoliosis correction, 59 modifier open correction.  7.  Via separate incision L2-L3 left lateral retroperitoneal approach to the   spine.  8.  L2-L3 lateral diskectomy and titanium interbody cage placement.  9.  L2-L3 interbody/allograft lateral fusion.  10.  L2-L3 lateral plating fixation.  11.  Intraoperative monitoring.     SURGEON:  Luis Puentes III, MD     ASSISTANT:  Sammi Wylie PA-C     ANESTHESIA:  General.     BLOOD LOSS:  10 mL.     FINDINGS:  Near complete correction of adult degenerative scoliosis, good   solid bony fixation.  No change in EMG.     COMPLICATIONS:  None.     DRAINS LEFT:  None.     DISPOSITION:  Extubated to recovery.  Stage II is on Wednesday.     HISTORY OF PRESENT ILLNESS:  This is a 58-year-old man who had advanced   degenerative spondylosis, stenosis and multilevel adult degenerative scoliosis   with a flat back.  He is a candidate for spinal reconstruction.  I explained   the risks, benefits and alternatives to him including pain, infection,   bleeding, CSF leak, failure to completely resolve all symptoms, neurologic   deficits including pain, weakness, numbness, bladder or bowel difficulties,   failure of fixation, failure of fusion, need for rostral caudal extension due   to junctional stenosis, injury to the bowel  contents, great vessels, ureter   and temporary or permanent sensory, motor neuropathies from transpsoas   approach.  He understood the risks, benefits and agreed to consent.     SUMMARY OF OPERATIVE PROCEDURE:  The patient was brought to the operating   suite, placed under general anesthesia.  [place] catheter was placed.  He was on   a OSI Marco A table with the special lateral spine positioner from [facility]   and then rolled into the right lateral decubitus position.  Axillary roll was   not required due to the pads themselves, arm was in winger position, he was   secured orthogonally to the bed verified by x-ray with all padded pressure   points secured with the special lateral positioner.  X-ray fluoroscopy was   brought in to localize L2-L3 and L3-L4 lateral incision with excellent lateral   x-rays.  Preoperative antibiotics were given.  Proper timeout was performed.    The patient was prepped and draped in sterile fashion.  [facility] hooked up   Safe-Op monitoring for EMGs and SSEPs.  There was never any significant neuro   firing.     The L3-L4 level was approached first.  We did an incision and then used blunt   dissection techniques to get all the way into the retroperitoneal fat with the   dilators.  We docked at the L3-L4 midpoint with the Alphate dilators and   circumferentially stimulated and found no meaningful neural tissue.  We opened   up a 160 cm dilating 2-blade Alphate retractor and then put an MONISHA blade in   disk lenny and had excellent apposition to the osteophytes and to the spine   itself with no significant  neural tissue udentified.     L3-L4 lateral diskectomy and titanium interbody cage placement:  Using the   [employer] lateral instruments and surface technology cages, we did a large   annulotomy using increasing size pituitary rasps, rongeurs and curettes with   scraping curettes to get down to bleeding bone all the way to the   contralateral annulus before cracking it.  We trialed out at  L3-L4, 8x22 mm   wide x 50 mm medial lateral with 10 degree lordosis cage and had nice tight   apposition with 50% reduction of the scoliosis at that level.  It was placed   with fluoroscopic guidance.  There was no change in EMG.     L3-L4 lateral interbody/allograft fusion:  Before introduction of the graft,   it was packed with Alpha graft cellular bone matrix for additional fusion   material, but these grafts were designed with surface technology to cause   direct binding vertebral body above and below helping to assure fusion and   this was assured by the choice of the graft and the size of it.     L3-L4 lateral plating fixation:  Using 1 up, 1 down indwelling plate, we awled   parallel to the endplates, confirmed bony confines with a Mejia probe and at   L4 placed a 5.5x45 mm screw with excellent bony purchase and at L3 we awled in   continuously and ran into a such hard bone that the awl deviated towards the   endplate and so this level was left without fixation, but it will be fixated   at the next separate incision level.  We copiously irrigated with   bacitracin-infused saline.  We laid down fibrillar as we slowly withdrew the   retractor with the disk lenny and MONISHA blade out and we had good hemostasis and   turned our attention to the separate incision.     Left lateral retroperitoneal approach to the L2-L3 spine with separate   incision:  The L2-L3 level was approached in the exact same way.  Sharp   incision, blunt dilators to dilate through the transversalis fascia into the   retroperitoneal fat through the muscle and guided into the psoas muscle and   docked at the midpoint with an Alphatec same size retractor with   circumferential stimulation done before that showing no meaningful neural   tissue.  Once it was expanded, I did see a nerve that was probably the   genitofemoral nerve that was able to preserve and it did not stimulate with   direct motor stimulation, so I assumed that  it was at least a  sensory nerve.    We were able to get around it at all points and so hopefully its function   would be preserved.     L2-L3 lateral diskectomy and titanium interbody cage placement:  Again with   the Alphatec using increasing size pituitary rasps, rongeurs and curettes,   performed a complete diskectomy down to bleeding bone and cracking the   contralateral annulus, we were able to deliver at L2-L3 [id number] mm medial   lateral wide x 10-degree lordotic cage and again this reduced the rest of the   scoliosis with a tight apposition of the endplates for future lordotic   correction.     L2-L3 lateral interbody/allograft arthrodesis and fusion:  Again, the graft   was packed with Alpha graft cellular bone matrix material, but these grafts   were designed with surface technology to cause direct binding vertebral body   above and below helping to assure fusion and the patient's bone quality was   excellent.     L2-L3 lateral plating fixation:  Using 1 up, 1 down plate integrated into the   cage, we awled parallel to the endplates, confirmed bony confines with a Mejia   probe and placed 5.5x45 mm screws.  We tightened them down over the    locking nut to prevent backing out of the screws.  We were happy   with our final construct on all the x-rays.  We copiously irrigated this   incision with bacitracin-infused saline.  We slowly withdrew the retractor,   laid down fibrillar, assured hemostasis and closed the wound in anatomic   layers with 0 Vicryl for the fascia, 2-0 Vicryls for the dermis and   Steri-Strips for the skin.  Sterile dressing was applied.  The patient was   rolled back into the supine position.  [place] removed and extubated.     There were no complications.  Needle and sponge count correct at the end of   the case.        ______________________________  MD YUMIKO Rincon III/JACKSON/SADIA    DD:  12/04/2023 14:16  DT:  12/04/2023 18:30    Job#:  [id number]

## 2023-12-06 ENCOUNTER — APPOINTMENT (OUTPATIENT)
Dept: RADIOLOGY | Facility: MEDICAL CENTER | Age: 58
DRG: 455 | End: 2023-12-06
Attending: NEUROLOGICAL SURGERY
Payer: COMMERCIAL

## 2023-12-06 ENCOUNTER — ANESTHESIA (OUTPATIENT)
Dept: SURGERY | Facility: MEDICAL CENTER | Age: 58
DRG: 455 | End: 2023-12-06
Payer: COMMERCIAL

## 2023-12-06 ENCOUNTER — ANESTHESIA EVENT (OUTPATIENT)
Dept: SURGERY | Facility: MEDICAL CENTER | Age: 58
DRG: 455 | End: 2023-12-06
Payer: COMMERCIAL

## 2023-12-06 LAB
ANION GAP SERPL CALC-SCNC: 7 MMOL/L (ref 7–16)
BUN SERPL-MCNC: 15 MG/DL (ref 8–22)
CALCIUM SERPL-MCNC: 8.9 MG/DL (ref 8.5–10.5)
CHLORIDE SERPL-SCNC: 103 MMOL/L (ref 96–112)
CO2 SERPL-SCNC: 29 MMOL/L (ref 20–33)
CREAT SERPL-MCNC: 0.67 MG/DL (ref 0.5–1.4)
ERYTHROCYTE [DISTWIDTH] IN BLOOD BY AUTOMATED COUNT: 47.8 FL (ref 35.9–50)
GFR SERPLBLD CREATININE-BSD FMLA CKD-EPI: 109 ML/MIN/1.73 M 2
GLUCOSE SERPL-MCNC: 102 MG/DL (ref 65–99)
HCT VFR BLD AUTO: 43.1 % (ref 42–52)
HGB BLD-MCNC: 14.2 G/DL (ref 14–18)
MCH RBC QN AUTO: 31.6 PG (ref 27–33)
MCHC RBC AUTO-ENTMCNC: 32.9 G/DL (ref 32.3–36.5)
MCV RBC AUTO: 96 FL (ref 81.4–97.8)
PLATELET # BLD AUTO: 181 K/UL (ref 164–446)
PMV BLD AUTO: 9.8 FL (ref 9–12.9)
POTASSIUM SERPL-SCNC: 4.3 MMOL/L (ref 3.6–5.5)
RBC # BLD AUTO: 4.49 M/UL (ref 4.7–6.1)
SODIUM SERPL-SCNC: 139 MMOL/L (ref 135–145)
WBC # BLD AUTO: 9.5 K/UL (ref 4.8–10.8)

## 2023-12-06 PROCEDURE — 00NY0ZZ RELEASE LUMBAR SPINAL CORD, OPEN APPROACH: ICD-10-PCS | Performed by: NEUROLOGICAL SURGERY

## 2023-12-06 PROCEDURE — 502240 HCHG MISC OR SUPPLY RC 0272: Performed by: NEUROLOGICAL SURGERY

## 2023-12-06 PROCEDURE — 85027 COMPLETE CBC AUTOMATED: CPT

## 2023-12-06 PROCEDURE — 700105 HCHG RX REV CODE 258: Performed by: ANESTHESIOLOGY

## 2023-12-06 PROCEDURE — 502000 HCHG MISC OR IMPLANTS RC 0278: Performed by: NEUROLOGICAL SURGERY

## 2023-12-06 PROCEDURE — 01NR0ZZ RELEASE SACRAL NERVE, OPEN APPROACH: ICD-10-PCS | Performed by: NEUROLOGICAL SURGERY

## 2023-12-06 PROCEDURE — 0SG30AJ FUSION OF LUMBOSACRAL JOINT WITH INTERBODY FUSION DEVICE, POSTERIOR APPROACH, ANTERIOR COLUMN, OPEN APPROACH: ICD-10-PCS | Performed by: NEUROLOGICAL SURGERY

## 2023-12-06 PROCEDURE — 8E0WXBZ COMPUTER ASSISTED PROCEDURE OF TRUNK REGION: ICD-10-PCS | Performed by: NEUROLOGICAL SURGERY

## 2023-12-06 PROCEDURE — 700111 HCHG RX REV CODE 636 W/ 250 OVERRIDE (IP): Mod: JZ | Performed by: NEUROLOGICAL SURGERY

## 2023-12-06 PROCEDURE — 700101 HCHG RX REV CODE 250: Performed by: NEUROLOGICAL SURGERY

## 2023-12-06 PROCEDURE — 80048 BASIC METABOLIC PNL TOTAL CA: CPT

## 2023-12-06 PROCEDURE — 160035 HCHG PACU - 1ST 60 MINS PHASE I: Performed by: NEUROLOGICAL SURGERY

## 2023-12-06 PROCEDURE — 700111 HCHG RX REV CODE 636 W/ 250 OVERRIDE (IP)

## 2023-12-06 PROCEDURE — 07DT3ZZ EXTRACTION OF BONE MARROW, PERCUTANEOUS APPROACH: ICD-10-PCS | Performed by: NEUROLOGICAL SURGERY

## 2023-12-06 PROCEDURE — 160042 HCHG SURGERY MINUTES - EA ADDL 1 MIN LEVEL 5: Performed by: NEUROLOGICAL SURGERY

## 2023-12-06 PROCEDURE — 770001 HCHG ROOM/CARE - MED/SURG/GYN PRIV*

## 2023-12-06 PROCEDURE — 160009 HCHG ANES TIME/MIN: Performed by: NEUROLOGICAL SURGERY

## 2023-12-06 PROCEDURE — C1713 ANCHOR/SCREW BN/BN,TIS/BN: HCPCS | Performed by: NEUROLOGICAL SURGERY

## 2023-12-06 PROCEDURE — 160002 HCHG RECOVERY MINUTES (STAT): Performed by: NEUROLOGICAL SURGERY

## 2023-12-06 PROCEDURE — 700102 HCHG RX REV CODE 250 W/ 637 OVERRIDE(OP)

## 2023-12-06 PROCEDURE — 700101 HCHG RX REV CODE 250: Performed by: ANESTHESIOLOGY

## 2023-12-06 PROCEDURE — 160048 HCHG OR STATISTICAL LEVEL 1-5: Performed by: NEUROLOGICAL SURGERY

## 2023-12-06 PROCEDURE — 0SG1071 FUSION OF 2 OR MORE LUMBAR VERTEBRAL JOINTS WITH AUTOLOGOUS TISSUE SUBSTITUTE, POSTERIOR APPROACH, POSTERIOR COLUMN, OPEN APPROACH: ICD-10-PCS | Performed by: NEUROLOGICAL SURGERY

## 2023-12-06 PROCEDURE — A9270 NON-COVERED ITEM OR SERVICE: HCPCS

## 2023-12-06 PROCEDURE — 700105 HCHG RX REV CODE 258

## 2023-12-06 PROCEDURE — 72100 X-RAY EXAM L-S SPINE 2/3 VWS: CPT

## 2023-12-06 PROCEDURE — 160031 HCHG SURGERY MINUTES - 1ST 30 MINS LEVEL 5: Performed by: NEUROLOGICAL SURGERY

## 2023-12-06 PROCEDURE — 110371 HCHG SHELL REV 272: Performed by: NEUROLOGICAL SURGERY

## 2023-12-06 PROCEDURE — A9270 NON-COVERED ITEM OR SERVICE: HCPCS | Performed by: PHYSICIAN ASSISTANT

## 2023-12-06 PROCEDURE — 700102 HCHG RX REV CODE 250 W/ 637 OVERRIDE(OP): Performed by: PHYSICIAN ASSISTANT

## 2023-12-06 PROCEDURE — 01NB0ZZ RELEASE LUMBAR NERVE, OPEN APPROACH: ICD-10-PCS | Performed by: NEUROLOGICAL SURGERY

## 2023-12-06 PROCEDURE — 160036 HCHG PACU - EA ADDL 30 MINS PHASE I: Performed by: NEUROLOGICAL SURGERY

## 2023-12-06 PROCEDURE — 110454 HCHG SHELL REV 250: Performed by: NEUROLOGICAL SURGERY

## 2023-12-06 PROCEDURE — 700111 HCHG RX REV CODE 636 W/ 250 OVERRIDE (IP): Mod: JZ | Performed by: ANESTHESIOLOGY

## 2023-12-06 PROCEDURE — 36415 COLL VENOUS BLD VENIPUNCTURE: CPT

## 2023-12-06 DEVICE — SCREW 7.5MM X 50MM POLYAXIAL (1EA): Type: IMPLANTABLE DEVICE | Site: SPINE LUMBAR | Status: FUNCTIONAL

## 2023-12-06 DEVICE — SCREW 7.5MM X 45MM POLYAXIAL (1EA): Type: IMPLANTABLE DEVICE | Site: SPINE LUMBAR | Status: FUNCTIONAL

## 2023-12-06 DEVICE — IMPLANTABLE DEVICE: Type: IMPLANTABLE DEVICE | Site: SPINE LUMBAR | Status: FUNCTIONAL

## 2023-12-06 DEVICE — SCREW 8.5MM X 50MM POLYAXIAL (1EA): Type: IMPLANTABLE DEVICE | Site: SPINE LUMBAR | Status: FUNCTIONAL

## 2023-12-06 DEVICE — SCREW SET INVICTUS: Type: IMPLANTABLE DEVICE | Site: SPINE LUMBAR | Status: FUNCTIONAL

## 2023-12-06 DEVICE — SCREW 6.5MM X 50MM POLYAXIAL (1EA): Type: IMPLANTABLE DEVICE | Site: SPINE LUMBAR | Status: FUNCTIONAL

## 2023-12-06 DEVICE — GRAFT CANCELLOUS CUBES 6-10MM 15CC: Type: IMPLANTABLE DEVICE | Status: FUNCTIONAL

## 2023-12-06 RX ORDER — HYDROCODONE BITARTRATE AND ACETAMINOPHEN 10; 325 MG/1; MG/1
1 TABLET ORAL EVERY 4 HOURS PRN
Status: DISCONTINUED | OUTPATIENT
Start: 2023-12-06 | End: 2023-12-10 | Stop reason: HOSPADM

## 2023-12-06 RX ORDER — IPRATROPIUM BROMIDE AND ALBUTEROL SULFATE 2.5; .5 MG/3ML; MG/3ML
3 SOLUTION RESPIRATORY (INHALATION)
Status: DISCONTINUED | OUTPATIENT
Start: 2023-12-06 | End: 2023-12-06 | Stop reason: HOSPADM

## 2023-12-06 RX ORDER — TEMAZEPAM 15 MG/1
15 CAPSULE ORAL
Status: DISCONTINUED | OUTPATIENT
Start: 2023-12-07 | End: 2023-12-10 | Stop reason: HOSPADM

## 2023-12-06 RX ORDER — ENOXAPARIN SODIUM 100 MG/ML
40 INJECTION SUBCUTANEOUS
Status: DISCONTINUED | OUTPATIENT
Start: 2023-12-07 | End: 2023-12-10 | Stop reason: HOSPADM

## 2023-12-06 RX ORDER — OXYCODONE HCL 5 MG/5 ML
10 SOLUTION, ORAL ORAL
Status: DISCONTINUED | OUTPATIENT
Start: 2023-12-06 | End: 2023-12-06 | Stop reason: HOSPADM

## 2023-12-06 RX ORDER — BUPIVACAINE HYDROCHLORIDE AND EPINEPHRINE 5; 5 MG/ML; UG/ML
INJECTION, SOLUTION PERINEURAL
Status: DISCONTINUED | OUTPATIENT
Start: 2023-12-06 | End: 2023-12-06 | Stop reason: HOSPADM

## 2023-12-06 RX ORDER — DEXAMETHASONE SODIUM PHOSPHATE 4 MG/ML
INJECTION, SOLUTION INTRA-ARTICULAR; INTRALESIONAL; INTRAMUSCULAR; INTRAVENOUS; SOFT TISSUE PRN
Status: DISCONTINUED | OUTPATIENT
Start: 2023-12-06 | End: 2023-12-06 | Stop reason: SURG

## 2023-12-06 RX ORDER — HALOPERIDOL 5 MG/ML
1 INJECTION INTRAMUSCULAR
Status: DISCONTINUED | OUTPATIENT
Start: 2023-12-06 | End: 2023-12-06 | Stop reason: HOSPADM

## 2023-12-06 RX ORDER — ROCURONIUM BROMIDE 10 MG/ML
INJECTION, SOLUTION INTRAVENOUS PRN
Status: DISCONTINUED | OUTPATIENT
Start: 2023-12-06 | End: 2023-12-06 | Stop reason: SURG

## 2023-12-06 RX ORDER — VANCOMYCIN HYDROCHLORIDE 1 G/20ML
INJECTION, POWDER, LYOPHILIZED, FOR SOLUTION INTRAVENOUS
Status: COMPLETED | OUTPATIENT
Start: 2023-12-06 | End: 2023-12-06

## 2023-12-06 RX ORDER — PHENYLEPHRINE HYDROCHLORIDE 10 MG/ML
INJECTION, SOLUTION INTRAMUSCULAR; INTRAVENOUS; SUBCUTANEOUS PRN
Status: DISCONTINUED | OUTPATIENT
Start: 2023-12-06 | End: 2023-12-06 | Stop reason: SURG

## 2023-12-06 RX ORDER — HYDROMORPHONE HYDROCHLORIDE 2 MG/ML
INJECTION, SOLUTION INTRAMUSCULAR; INTRAVENOUS; SUBCUTANEOUS PRN
Status: DISCONTINUED | OUTPATIENT
Start: 2023-12-06 | End: 2023-12-06 | Stop reason: SURG

## 2023-12-06 RX ORDER — DIPHENHYDRAMINE HYDROCHLORIDE 50 MG/ML
25 INJECTION INTRAMUSCULAR; INTRAVENOUS EVERY 6 HOURS PRN
Status: DISCONTINUED | OUTPATIENT
Start: 2023-12-06 | End: 2023-12-10 | Stop reason: HOSPADM

## 2023-12-06 RX ORDER — OXYCODONE HCL 5 MG/5 ML
5 SOLUTION, ORAL ORAL
Status: DISCONTINUED | OUTPATIENT
Start: 2023-12-06 | End: 2023-12-06 | Stop reason: HOSPADM

## 2023-12-06 RX ORDER — SODIUM CHLORIDE, SODIUM LACTATE, POTASSIUM CHLORIDE, CALCIUM CHLORIDE 600; 310; 30; 20 MG/100ML; MG/100ML; MG/100ML; MG/100ML
INJECTION, SOLUTION INTRAVENOUS
Status: DISCONTINUED | OUTPATIENT
Start: 2023-12-06 | End: 2023-12-06 | Stop reason: SURG

## 2023-12-06 RX ORDER — ONDANSETRON 2 MG/ML
INJECTION INTRAMUSCULAR; INTRAVENOUS PRN
Status: DISCONTINUED | OUTPATIENT
Start: 2023-12-06 | End: 2023-12-06 | Stop reason: SURG

## 2023-12-06 RX ORDER — MEPERIDINE HYDROCHLORIDE 25 MG/ML
12.5 INJECTION INTRAMUSCULAR; INTRAVENOUS; SUBCUTANEOUS
Status: DISCONTINUED | OUTPATIENT
Start: 2023-12-06 | End: 2023-12-06 | Stop reason: HOSPADM

## 2023-12-06 RX ORDER — CEFAZOLIN SODIUM 1 G/3ML
INJECTION, POWDER, FOR SOLUTION INTRAMUSCULAR; INTRAVENOUS PRN
Status: DISCONTINUED | OUTPATIENT
Start: 2023-12-06 | End: 2023-12-06 | Stop reason: SURG

## 2023-12-06 RX ORDER — ONDANSETRON 2 MG/ML
4 INJECTION INTRAMUSCULAR; INTRAVENOUS
Status: DISCONTINUED | OUTPATIENT
Start: 2023-12-06 | End: 2023-12-06 | Stop reason: HOSPADM

## 2023-12-06 RX ORDER — BUPIVACAINE HYDROCHLORIDE 5 MG/ML
INJECTION, SOLUTION EPIDURAL; INTRACAUDAL
Status: DISCONTINUED | OUTPATIENT
Start: 2023-12-06 | End: 2023-12-06 | Stop reason: HOSPADM

## 2023-12-06 RX ORDER — HYDRALAZINE HYDROCHLORIDE 20 MG/ML
5 INJECTION INTRAMUSCULAR; INTRAVENOUS
Status: DISCONTINUED | OUTPATIENT
Start: 2023-12-06 | End: 2023-12-06 | Stop reason: HOSPADM

## 2023-12-06 RX ORDER — HYDROMORPHONE HYDROCHLORIDE 1 MG/ML
0.2 INJECTION, SOLUTION INTRAMUSCULAR; INTRAVENOUS; SUBCUTANEOUS
Status: DISCONTINUED | OUTPATIENT
Start: 2023-12-06 | End: 2023-12-06 | Stop reason: HOSPADM

## 2023-12-06 RX ORDER — DIPHENHYDRAMINE HCL 25 MG
25 TABLET ORAL EVERY 6 HOURS PRN
Status: DISCONTINUED | OUTPATIENT
Start: 2023-12-06 | End: 2023-12-10 | Stop reason: HOSPADM

## 2023-12-06 RX ORDER — MIDAZOLAM HYDROCHLORIDE 1 MG/ML
1 INJECTION INTRAMUSCULAR; INTRAVENOUS
Status: DISCONTINUED | OUTPATIENT
Start: 2023-12-06 | End: 2023-12-06 | Stop reason: HOSPADM

## 2023-12-06 RX ORDER — LIDOCAINE HYDROCHLORIDE 20 MG/ML
INJECTION, SOLUTION EPIDURAL; INFILTRATION; INTRACAUDAL; PERINEURAL PRN
Status: DISCONTINUED | OUTPATIENT
Start: 2023-12-06 | End: 2023-12-06 | Stop reason: SURG

## 2023-12-06 RX ORDER — LABETALOL HYDROCHLORIDE 5 MG/ML
5 INJECTION, SOLUTION INTRAVENOUS
Status: DISCONTINUED | OUTPATIENT
Start: 2023-12-06 | End: 2023-12-06 | Stop reason: HOSPADM

## 2023-12-06 RX ORDER — HYDROMORPHONE HYDROCHLORIDE 1 MG/ML
0.1 INJECTION, SOLUTION INTRAMUSCULAR; INTRAVENOUS; SUBCUTANEOUS
Status: DISCONTINUED | OUTPATIENT
Start: 2023-12-06 | End: 2023-12-06 | Stop reason: HOSPADM

## 2023-12-06 RX ORDER — SODIUM CHLORIDE, SODIUM LACTATE, POTASSIUM CHLORIDE, CALCIUM CHLORIDE 600; 310; 30; 20 MG/100ML; MG/100ML; MG/100ML; MG/100ML
INJECTION, SOLUTION INTRAVENOUS CONTINUOUS
Status: DISCONTINUED | OUTPATIENT
Start: 2023-12-06 | End: 2023-12-06 | Stop reason: HOSPADM

## 2023-12-06 RX ORDER — CEFAZOLIN SODIUM 1 G/3ML
INJECTION, POWDER, FOR SOLUTION INTRAMUSCULAR; INTRAVENOUS
Status: DISCONTINUED | OUTPATIENT
Start: 2023-12-06 | End: 2023-12-06 | Stop reason: HOSPADM

## 2023-12-06 RX ORDER — HYDROMORPHONE HYDROCHLORIDE 1 MG/ML
0.4 INJECTION, SOLUTION INTRAMUSCULAR; INTRAVENOUS; SUBCUTANEOUS
Status: DISCONTINUED | OUTPATIENT
Start: 2023-12-06 | End: 2023-12-06 | Stop reason: HOSPADM

## 2023-12-06 RX ORDER — METHOCARBAMOL 750 MG/1
750 TABLET, FILM COATED ORAL EVERY 8 HOURS
Status: DISCONTINUED | OUTPATIENT
Start: 2023-12-06 | End: 2023-12-10 | Stop reason: HOSPADM

## 2023-12-06 RX ADMIN — SODIUM CHLORIDE, POTASSIUM CHLORIDE, SODIUM LACTATE AND CALCIUM CHLORIDE: 600; 310; 30; 20 INJECTION, SOLUTION INTRAVENOUS at 09:31

## 2023-12-06 RX ADMIN — ONDANSETRON 4 MG: 2 INJECTION INTRAMUSCULAR; INTRAVENOUS at 13:02

## 2023-12-06 RX ADMIN — ROCURONIUM BROMIDE 50 MG: 50 INJECTION, SOLUTION INTRAVENOUS at 07:28

## 2023-12-06 RX ADMIN — PHENYLEPHRINE HYDROCHLORIDE 100 MCG: 10 INJECTION INTRAVENOUS at 09:00

## 2023-12-06 RX ADMIN — DOCUSATE SODIUM 50 MG AND SENNOSIDES 8.6 MG 1 TABLET: 8.6; 5 TABLET, FILM COATED ORAL at 22:51

## 2023-12-06 RX ADMIN — ROCURONIUM BROMIDE 50 MG: 50 INJECTION, SOLUTION INTRAVENOUS at 11:25

## 2023-12-06 RX ADMIN — CEFAZOLIN 3 G: 1 INJECTION, POWDER, FOR SOLUTION INTRAMUSCULAR; INTRAVENOUS at 07:41

## 2023-12-06 RX ADMIN — CHOLECALCIFEROL TAB 125 MCG (5000 UNIT) 5000 UNITS: 125 TAB at 05:23

## 2023-12-06 RX ADMIN — CEFAZOLIN 3 G: 1 INJECTION, POWDER, FOR SOLUTION INTRAMUSCULAR; INTRAVENOUS at 11:39

## 2023-12-06 RX ADMIN — HYDROMORPHONE HYDROCHLORIDE 1 MG: 2 INJECTION INTRAMUSCULAR; INTRAVENOUS; SUBCUTANEOUS at 12:58

## 2023-12-06 RX ADMIN — GABAPENTIN 300 MG: 300 CAPSULE ORAL at 22:51

## 2023-12-06 RX ADMIN — ANTACID TABLETS 500 MG: 500 TABLET, CHEWABLE ORAL at 05:24

## 2023-12-06 RX ADMIN — SODIUM CHLORIDE, POTASSIUM CHLORIDE, SODIUM LACTATE AND CALCIUM CHLORIDE: 600; 310; 30; 20 INJECTION, SOLUTION INTRAVENOUS at 07:39

## 2023-12-06 RX ADMIN — SUGAMMADEX 200 MG: 100 INJECTION, SOLUTION INTRAVENOUS at 13:01

## 2023-12-06 RX ADMIN — PHENYLEPHRINE HYDROCHLORIDE 100 MCG: 10 INJECTION INTRAVENOUS at 09:05

## 2023-12-06 RX ADMIN — HYDROMORPHONE HYDROCHLORIDE 2 MG: 2 INJECTION INTRAMUSCULAR; INTRAVENOUS; SUBCUTANEOUS at 07:28

## 2023-12-06 RX ADMIN — CEFAZOLIN 2 G: 2 INJECTION, POWDER, FOR SOLUTION INTRAMUSCULAR; INTRAVENOUS at 23:26

## 2023-12-06 RX ADMIN — PHENYLEPHRINE HYDROCHLORIDE 100 MCG: 10 INJECTION INTRAVENOUS at 08:04

## 2023-12-06 RX ADMIN — PROPOFOL 75 MCG/KG/MIN: 10 INJECTION, EMULSION INTRAVENOUS at 07:32

## 2023-12-06 RX ADMIN — DEXAMETHASONE SODIUM PHOSPHATE 8 MG: 4 INJECTION INTRA-ARTICULAR; INTRALESIONAL; INTRAMUSCULAR; INTRAVENOUS; SOFT TISSUE at 07:32

## 2023-12-06 RX ADMIN — HYDROMORPHONE HYDROCHLORIDE 1 MG: 2 INJECTION INTRAMUSCULAR; INTRAVENOUS; SUBCUTANEOUS at 13:02

## 2023-12-06 RX ADMIN — ROCURONIUM BROMIDE 50 MG: 50 INJECTION, SOLUTION INTRAVENOUS at 08:09

## 2023-12-06 RX ADMIN — DOCUSATE SODIUM 100 MG: 100 CAPSULE, LIQUID FILLED ORAL at 22:51

## 2023-12-06 RX ADMIN — PROPOFOL 200 MG: 10 INJECTION, EMULSION INTRAVENOUS at 07:28

## 2023-12-06 RX ADMIN — ONDANSETRON 4 MG: 2 INJECTION INTRAMUSCULAR; INTRAVENOUS at 07:32

## 2023-12-06 RX ADMIN — ANTACID TABLETS 500 MG: 500 TABLET, CHEWABLE ORAL at 18:29

## 2023-12-06 RX ADMIN — SODIUM CHLORIDE, POTASSIUM CHLORIDE, SODIUM LACTATE AND CALCIUM CHLORIDE: 600; 310; 30; 20 INJECTION, SOLUTION INTRAVENOUS at 12:31

## 2023-12-06 RX ADMIN — ROCURONIUM BROMIDE 50 MG: 50 INJECTION, SOLUTION INTRAVENOUS at 10:08

## 2023-12-06 RX ADMIN — METHOCARBAMOL 750 MG: 750 TABLET ORAL at 22:51

## 2023-12-06 RX ADMIN — LIDOCAINE HYDROCHLORIDE 100 MG: 20 INJECTION, SOLUTION EPIDURAL; INFILTRATION; INTRACAUDAL at 07:28

## 2023-12-06 ASSESSMENT — PAIN DESCRIPTION - PAIN TYPE
TYPE: ACUTE PAIN
TYPE: SURGICAL PAIN

## 2023-12-06 ASSESSMENT — PAIN SCALES - GENERAL: PAIN_LEVEL: 0

## 2023-12-06 NOTE — OR SURGEON
Immediate Post OP Note    PreOp Diagnosis: Lumbar radiculopathy, lumbar stenosis, neurogenic claudication, back pain.      PostOp Diagnosis: Same      Procedure(s):  STAGE #2 LUMBAR 2-5 LAMINECTOMY, LUMBAR 2- SACRAL 1 FUSION WITH LUMBAR 5- SACRAL 1 TRANSFORAMINAL LUMBAR INTERBODY FUSION WITH STEALTH AND HIP BONE MARROW ASPIRATE - Wound Class: Clean  LAMINECTOMY, SPINE, LUMBAR, WITH DISCECTOMY - Wound Class: Clean  ASPIRATION, BONE MARROW - Wound Class: Clean    Surgeon(s):  Luis Puentes III, M.D.    Anesthesiologist/Type of Anesthesia:  Anesthesiologist: Geovanni Rios M.D./General    Surgical Staff:  Cell Saver : [facility]  Circulator: Emily López R.N.  Relief Circulator: Yovanny Camarillo R.N.  Scrub Person: Cammy Sifuentes; ELSA Lawson.NSHEEBA; Jake Navarro; Leonora Mary  First Assist: Kelley Peralta P.A.-C.  Radiology Technologist: Marianne Jacobson    Specimens removed if any:  * No specimens in log *    Estimated Blood Loss: 750cc, 230cc returned via cell saver    Findings:  L2-S1 DDD, patient tolerated well, see full op report.     Complications: None        12/6/2023 2:00 PM Kelley Peralta P.A.-C.

## 2023-12-06 NOTE — ANESTHESIA POSTPROCEDURE EVALUATION
Patient: Quan Quesada    Procedure Summary       Date: 12/06/23 Room / Location: [facility] 06 / SURGERY [facility]    Anesthesia Start: 0727 Anesthesia Stop: 1336    Procedures:       STAGE #2 LUMBAR 2-5 LAMINECTOMY, LUMBAR 2- SACRAL 1 FUSION WITH LUMBAR 5- SACRAL 1 TRANSFORAMINAL LUMBAR INTERBODY FUSION WITH STEALTH AND HIP BONE MARROW ASPIRATE (Spine Lumbar)      LAMINECTOMY, SPINE, LUMBAR, WITH DISCECTOMY (Spine Lumbar)      ASPIRATION, BONE MARROW (Right: Hip) Diagnosis: (LUMBAR STENOSIS, SPONDYLOSIS)    Surgeons: Luis Puentes III, M.D. Responsible Provider: Geovanni Riso M.D.    Anesthesia Type: general ASA Status: 2            Final Anesthesia Type: general  Last vitals  BP   Blood Pressure: 99/69    Temp   37.2 °C (99 °F)    Pulse   99   Resp   14    SpO2   92 %      Anesthesia Post Evaluation    Patient location during evaluation: PACU  Patient participation: complete - patient participated  Level of consciousness: sleepy but conscious  Pain score: 0    Airway patency: patent  Anesthetic complications: no  Cardiovascular status: hemodynamically stable  Respiratory status: acceptable  Hydration status: euvolemic    PONV: none          No notable events documented.     Nurse Pain Score: 0 (NPRS)

## 2023-12-06 NOTE — THERAPY
"Occupational Therapy   Initial Evaluation     Patient Name: Quan Quesada  Age:  58 y.o., Sex:  male  Medical Record #: [id number]  Today's Date: 12/5/2023       Precautions: Fall Risk, Spinal / Back Precautions , Lumbosacral Orthosis  Comments: LSO on when OOB, Stage 2 sx 12/6    Assessment  Patient is 58 y.o. male with a diagnosis of low back pain & lumbar spondylosis.  Pt is s/p stage 1 L2-L4 XLIF.  Pt is planned for stage 2 Wednesday L2-L5 lami, L5S1 TLIF & stealth L2-S1 PLIF.  Today pt was pleasant, very receptive to new learning & eager to get OOB.  Pt educated on spinal precautions during ADL's & homemaking tasks & provided with a written handout. Pt able to don LSO seated EOB with set up.  Pt amb to bathroom with FWW & SBA.  Pt groomed with supervision standing at the sink.  Pt stated he lives alone but will have his friend come stay with him to assist with homemaking tasks, grocery shopping & meals as needed.  Pt did very well after his Stage 1 sx.  OT to follow up after Stage 2 to assess for additional D/C needs.  Currently pt appears capable of D/C home with HH therapy & support of friend.      Plan    Occupational Therapy Initial Treatment Plan   Treatment Interventions: Self Care / Activities of Daily Living, Adaptive Equipment, Neuro Re-Education / Balance, Therapeutic Exercises, Therapeutic Activity  Treatment Frequency: 3 Times per Week  Duration: Until Therapy Goals Met    DC Equipment Recommendations: Unable to determine at this time  Discharge Recommendations: Recommend home health for continued occupational therapy services     Subjective    \"It feels great to get OOB, I appreciate all the advise you gave me\"     Objective      Initial Contact Note    Initial Contact Note Order Received and Verified, Occupational Therapy Evaluation in Progress with Full Report to Follow.   Prior Living Situation   Prior Services None   Housing / Facility 1 Story House   Steps Into Home 2   Steps In Home 0 "   Bathroom Set up Walk In Shower;Shower Chair   Equipment Owned None   Lives with - Patient's Self Care Capacity Alone and Able to Care For Self   Comments Pt reports he has a friend who is planning to stay with him at D/C.  Met friend at the end of tx & she is well prepared to assist as needed   Prior Level of ADL Function   Self Feeding Independent   Grooming / Hygiene Independent   Bathing Independent   Dressing Independent   Toileting Independent   Prior Level of IADL Function   Medication Management Independent   Laundry Independent   Kitchen Mobility Independent   Finances Independent   Home Management Independent   Shopping Independent   Prior Level Of Mobility Independent Without Device in Community   Driving / Transportation Driving Independent   Occupation (Pre-Hospital Vocational) Employed Part Time   Precautions   Precautions Fall Risk;Spinal / Back Precautions ;Lumbosacral Orthosis   Comments LSO on when OOB, Stage 2 sx 12/6   Vitals   Pulse Oximetry 96 %   Room Air Oximetry 91   O2 (LPM) 3   O2 Delivery Device Silicone Nasal Cannula   Pain   Pain Scales 0 to 10 Scale    Intervention Ambulation / Increased Activity   Pain 0 - 10 Group   Location Back   Description Sore   Therapist Pain Assessment During Activity;Nurse Notified;3   Cognition    Cognition / Consciousness WDL   Level of Consciousness Alert   Comments Pt pleasant & very receptive to new learning.  Pt eager to regain his independence   Passive ROM Upper Body   Passive ROM Upper Body WDL   Active ROM Upper Body   Active ROM Upper Body  WDL   Strength Upper Body   Upper Body Strength  WDL   Sensation Upper Body   Upper Extremity Sensation  WDL   Coordination Upper Body   Coordination WDL   Balance Assessment   Sitting Balance (Static) Fair +   Sitting Balance (Dynamic) Fair   Standing Balance (Static) Fair   Standing Balance (Dynamic) Fair   Weight Shift Sitting Good   Weight Shift Standing Good   Bed Mobility    Supine to Sit Minimal Assist  "  Sit to Supine   (pt left up in chair)   Scooting Supervised   Rolling Contact Guard Assist   Comments Pt taught how to log roll out of flat bed   ADL Assessment   Eating Modified Independent   Grooming Supervision;Standing   Upper Body Dressing Supervision   Lower Body Dressing Moderate Assist   Toileting Minimal Assist   Comments Pt able to don LSO seated EOB with SBA & V/Q's   Functional Mobility   Sit to Stand Contact Guard Assist   Bed, Chair, Wheelchair Transfer Contact Guard Assist   Toilet Transfers Contact Guard Assist   Transfer Method Stand Step   Activity Tolerance   Sitting in Chair 35   Sitting Edge of Bed 15   Standing 14   Patient / Family Goals   Patient / Family Goal #1 \"To get back in shape & loose some weight\"   Short Term Goals   Short Term Goal # 1 Pt will demonstrate spinal precautions during ADL's & homemaking tasks   Short Term Goal # 2 Pt will dress LB with supervision & AE   Short Term Goal # 3 Pt will be supervised with ADL transfers   Education Group   Education Provided Spinal Precautions;Activities of Daily Living   Role of Occupational Therapist Patient Response Patient;Caregiver;Acceptance;Explanation;Verbal Demonstration   Spinal Precautions Patient Response Patient;Caregiver;Acceptance;Explanation;Demonstration;Handout;Verbal Demonstration;Action Demonstration;Reinforcement Needed   ADL Patient Response Patient;Acceptance;Explanation;Demonstration;Verbal Demonstration;Action Demonstration   Occupational Therapy Initial Treatment Plan    Treatment Interventions Self Care / Activities of Daily Living;Adaptive Equipment;Neuro Re-Education / Balance;Therapeutic Exercises;Therapeutic Activity   Treatment Frequency 3 Times per Week   Duration Until Therapy Goals Met   Problem List   Problem List Decreased Active Daily Living Skills;Decreased Homemaking Skills;Decreased Functional Mobility;Decreased Activity Tolerance;Impaired Postural Control / Balance;Limited Knowledge of Post Op " Precautions   Anticipated Discharge Equipment and Recommendations   DC Equipment Recommendations Unable to determine at this time   Discharge Recommendations Recommend home health for continued occupational therapy services   Interdisciplinary Plan of Care Collaboration   IDT Collaboration with  Nursing;Family / Caregiver   Patient Position at End of Therapy Seated;Call Light within Reach;Tray Table within Reach;Phone within Reach;Family / Friend in Room   Collaboration Comments Nsg notified of OT findings   Session Information   Date / Session Number  12/5 #1 (1/3, 12/11)

## 2023-12-06 NOTE — ANESTHESIA PROCEDURE NOTES
Airway    Date/Time: 12/6/2023 7:39 AM    Performed by: Geovanni Rios M.D.  Authorized by: Geovanni Rios M.D.    Location:  OR  Urgency:  Elective  Indications for Airway Management:  Anesthesia      Spontaneous Ventilation: absent    Sedation Level:  Deep  Preoxygenated: Yes    Patient Position:  Sniffing  Mask Difficulty Assessment:  2 - vent by mask + OA or adjuvant +/- NMBA  Final Airway Type:  Endotracheal airway  Final Endotracheal Airway:  ETT  Cuffed: Yes    Technique Used for Successful ETT Placement:  Direct laryngoscopy    Insertion Site:  Oral  Blade Type:  Glide  Laryngoscope Blade/Videolaryngoscope Blade Size:  4  ETT Size (mm):  7.5  Measured from:  Teeth  ETT to Teeth (cm):  23  Placement Verified by: auscultation and capnometry    Cormack-Lehane Classification:  Grade I - full view of glottis  Number of Attempts at Approach:  1  Number of Other Approaches Attempted:  1  Unsuccessful Approach(es) for ETT:  Direct laryngoscopy   Attempted DL with Hogan 2 x 1. Unable to visualize glottis. Able to bag mask ventilate with two hands, and oral airway. Converted to [place], able to intubate. Extremely edematous soft tissue in posterior glottic opening.

## 2023-12-06 NOTE — PROGRESS NOTES
Received a text via Vo[employer] from Pre-op, Pavan Gerardo that pt. surgery is moved  to 0730 12/6/2023. NPO after 2330 except for small sips with meds.

## 2023-12-06 NOTE — ANESTHESIA TIME REPORT
Anesthesia Start and Stop Event Times       Date Time Event    12/6/2023 0722 Ready for Procedure     0727 Anesthesia Start     1336 Anesthesia Stop          Responsible Staff  12/06/23      Name Role Begin End    Geovanni Rios M.D. Anesth [id number]          Overtime Reason:  no overtime (within assigned shift)    Comments:

## 2023-12-06 NOTE — PROGRESS NOTES
Pt. Was transported to pre-op via hospital bed by [facility]. Pt. Was A&Ox4, GCS 15, no signs and symptoms of distress.

## 2023-12-06 NOTE — CARE PLAN
Problem: Pain - Standard  Goal: Alleviation of pain or a reduction in pain to the patient’s comfort goal  Description: Target End Date:  Prior to discharge or change in level of care    Document on Vitals flowsheet    1.  Document pain using the appropriate pain scale per order or unit policy  2.  Educate and implement non-pharmacologic comfort measures (i.e. relaxation, distraction, massage, cold/heat therapy, etc.)  3.  Pain management medications as ordered  4.  Reassess pain after pain med administration per policy  5.  If opiods administered assess patient's response to pain medication is appropriate per POSS sedation scale  6.  Follow pain management plan developed in collaboration with patient and interdisciplinary team (including palliative care or pain specialists if applicable)  Outcome: Progressing   The patient is Stable - Low risk of patient condition declining or worsening    Shift Goals  Clinical Goals: Pain control, safe mobility, NPO  at 12 midnight sips with meds  Patient Goals: rest, pain control  Family Goals: Not present    Progress made toward(s) clinical / shift goals:      Pt. Vital signs were in normal range.   Pt. Verbalized understanding on the care provided.   Pt. Rated pain between 2 and 5, from 1-10, 10 being the most painful. Pain medications given as ordered.   Pt. Didn't fall under RN care. Fall precautions were in place.

## 2023-12-06 NOTE — ANESTHESIA PREPROCEDURE EVALUATION
Case: [id number] Date/Time: 12/06/23 0715    Procedures:       STAGE #2 LUMBAR 2-5 LAMINECTOMY, LUMBAR 2- SACRAL 1 FUSION WITH LUMBAR 5- SACRAL 1 TRANSFORAMINAL LUMBAR INTERBODY FUSION WITH STEALTH AND HIP BONE MARROW ASPIRATE      LAMINECTOMY, SPINE, LUMBAR, WITH DISCECTOMY      ASPIRATION, BONE MARROW    Pre-op diagnosis: LUMBAR STENOSIS, SPONDYLOSIS    Location: [facility] OR 06 / SURGERY [facility]    Surgeons: Luis Puentes III, M.D.          Lumbar radiculopathy.     Tolerated anesthesia two days ago without issues.     NPO.     Obese.   Relevant Problems   ANESTHESIA (within normal limits)      PULMONARY (within normal limits)      NEURO (within normal limits)      CARDIAC (within normal limits)      GI (within normal limits)       (within normal limits)      ENDO (within normal limits)      OB (within normal limits)       Physical Exam    Airway   Mallampati: II  TM distance: >3 FB  Neck ROM: full       Cardiovascular - normal exam  Rhythm: regular  Rate: normal  (-) murmur     Dental - normal exam           Pulmonary - normal exam  Breath sounds clear to auscultation     Abdominal    Neurological - normal exam                   Anesthesia Plan    ASA 2       Plan - general       Airway plan will be ETT          Induction: intravenous    Postoperative Plan: Postoperative administration of opioids is intended.    Pertinent diagnostic labs and testing reviewed    Informed Consent:    Anesthetic plan and risks discussed with patient.    Use of blood products discussed with: patient whom consented to blood products.

## 2023-12-06 NOTE — PROGRESS NOTES
Neurosurgery Progress Note    Subjective:  POD#2 stage I L2-4 XLIF via left approach  POD#0 L2-5 laminectomy, L5-S1 TLIF, and stealth L2-S1 PSF    Doing well this AM, tolerable back pain.  Some left hip/anterior thigh discomfort related to XLIF  Ambulating some, voiding, +small BM    NPO, VSS for surgery today.       Exam:  Pleasant and cooperative  Dressing c/d/I  BLE strength 5/5 throughout  Sensation intact throughout      BP  Min: 114/76  Max: 143/86  Pulse  Av.6  Min: 90  Max: 102  Resp  Av.8  Min: 16  Max: 20  Temp  Av.7 °C (98.1 °F)  Min: 36.1 °C (97 °F)  Max: 37.1 °C (98.8 °F)  SpO2  Av.6 %  Min: 95 %  Max: 96 %    No data recorded    Recent Labs     23  0503 23  0348   WBC 11.6* 9.5   RBC 4.78 4.49*   HEMOGLOBIN 15.0 14.2   HEMATOCRIT 45.8 43.1   MCV 95.8 96.0   MCH 31.4 31.6   MCHC 32.8 32.9   RDW 47.1 47.8   PLATELETCT 208 181   MPV 9.7 9.8       Recent Labs     23  0503 23  0348   SODIUM 140 139   POTASSIUM 4.5 4.3   CHLORIDE 104 103   CO2 26 29   GLUCOSE 149* 102*   BUN 15 15   CREATININE 0.74 0.67   CALCIUM 8.4* 8.9                 Intake/Output                         23 07 - 23 0659 23 - 23 0659      Total  Total                 Intake    P.O.  240  -- 240  --  -- --    P.O. 240 -- 240 -- -- --    I.V.  179.5  -- 179.5  --  -- --    Volume (mL) (lactated ringers infusion) 179.5 -- 179.5 -- -- --    IV Piggyback  195.5  -- 195.5  --  -- --    Volume (mL) (ceFAZolin (Ancef) 2 g in  mL IVPB) 195.5 -- 195.5 -- -- --    Total Intake 615 -- 615 -- -- --       Output    Urine  1075  500 1575  200  -- 200    Number of Times Voided 1 x -- 1 x -- -- --    Urine Void (mL) 1820 453 4806 200 -- 200    Stool  --  -- --  --  -- --    Number of Times Stooled -- 2 x 2 x -- -- --    Total Output 5304 152 7046 200 -- 200       Net I/O     -460 -500 -960 -200 -- -200              Intake/Output Summary  (Last 24 hours) at 12/6/2023 0728  Last data filed at 12/6/2023 0700  Gross per 24 hour   Intake 494.96 ml   Output 1275 ml   Net -780.04 ml               [MAR Hold] gabapentin  300 mg Q EVENING    [MAR Hold] Pharmacy Consult Request  1 Each PHARMACY TO DOSE    [MAR Hold] MD ARAUJO...DO NOT ADMINISTER NSAIDS or ASPIRIN unless ORDERED By Neurosurgery  1 Each PRN    [MAR Hold] docusate sodium  100 mg BID    [MAR Hold] senna-docusate  1 Tablet Nightly    [MAR Hold] senna-docusate  1 Tablet Q24HRS PRN    [MAR Hold] polyethylene glycol/lytes  1 Packet BID PRN    [MAR Hold] magnesium hydroxide  30 mL QDAY PRN    [MAR Hold] bisacodyl  10 mg Q24HRS PRN    [MAR Hold] sodium phosphate  1 Each Once PRN    NS   Continuous    [MAR Hold] acetaminophen  650 mg Q4HRS PRN    [MAR Hold] HYDROcodone-acetaminophen  1 Tablet Q4HRS PRN    [MAR Hold] HYDROcodone/acetaminophen  1 Tablet Q4HRS PRN    [MAR Hold] oxyCODONE immediate-release  5 mg Q4HRS PRN    Or    [MAR Hold] oxyCODONE immediate-release  10 mg Q4HRS PRN    [MAR Hold] HYDROmorphone  0.5 mg Q3HRS PRN    [MAR Hold] ondansetron  4 mg Q4HRS PRN    [MAR Hold] ondansetron  4 mg Q4HRS PRN    [MAR Hold] promethazine  12.5-25 mg Q4HRS PRN    [MAR Hold] promethazine  12.5-25 mg Q4HRS PRN    [MAR Hold] prochlorperazine  5-10 mg Q4HRS PRN    [MAR Hold] methocarbamol  750 mg Q8HRS PRN    labetalol  10 mg Q HOUR PRN    [MAR Hold] benzocaine-menthol  1 Lozenge Q2HRS PRN    [MAR Hold] calcium carbonate  500 mg BID    [MAR Hold] vitamin D3  5,000 Units DAILY       Assessment and Plan:  Hospital day # 3  POD# 2/0  Discussed stage II procedure in detail including risks, benefits and alternatives and patient is ready to proceed.   He is NPO, VSS and labs stable for surgery today.     Chemical prophylactic DVT therapy: No  Start date/time: 12/7 after surgery

## 2023-12-07 LAB
ANION GAP SERPL CALC-SCNC: 6 MMOL/L (ref 7–16)
BUN SERPL-MCNC: 22 MG/DL (ref 8–22)
CALCIUM SERPL-MCNC: 7.7 MG/DL (ref 8.5–10.5)
CHLORIDE SERPL-SCNC: 99 MMOL/L (ref 96–112)
CO2 SERPL-SCNC: 29 MMOL/L (ref 20–33)
CREAT SERPL-MCNC: 0.83 MG/DL (ref 0.5–1.4)
ERYTHROCYTE [DISTWIDTH] IN BLOOD BY AUTOMATED COUNT: 46.5 FL (ref 35.9–50)
GFR SERPLBLD CREATININE-BSD FMLA CKD-EPI: 102 ML/MIN/1.73 M 2
GLUCOSE BLD STRIP.AUTO-MCNC: 154 MG/DL (ref 65–99)
GLUCOSE SERPL-MCNC: 156 MG/DL (ref 65–99)
HCT VFR BLD AUTO: 35 % (ref 42–52)
HGB BLD-MCNC: 11.4 G/DL (ref 14–18)
MCH RBC QN AUTO: 31.4 PG (ref 27–33)
MCHC RBC AUTO-ENTMCNC: 32.6 G/DL (ref 32.3–36.5)
MCV RBC AUTO: 96.4 FL (ref 81.4–97.8)
PLATELET # BLD AUTO: 171 K/UL (ref 164–446)
PMV BLD AUTO: 10 FL (ref 9–12.9)
POTASSIUM SERPL-SCNC: 4.5 MMOL/L (ref 3.6–5.5)
RBC # BLD AUTO: 3.63 M/UL (ref 4.7–6.1)
SODIUM SERPL-SCNC: 134 MMOL/L (ref 135–145)
WBC # BLD AUTO: 11.6 K/UL (ref 4.8–10.8)

## 2023-12-07 PROCEDURE — 51798 US URINE CAPACITY MEASURE: CPT

## 2023-12-07 PROCEDURE — 770001 HCHG ROOM/CARE - MED/SURG/GYN PRIV*

## 2023-12-07 PROCEDURE — 700105 HCHG RX REV CODE 258: Performed by: PHYSICIAN ASSISTANT

## 2023-12-07 PROCEDURE — 700102 HCHG RX REV CODE 250 W/ 637 OVERRIDE(OP): Performed by: PHYSICIAN ASSISTANT

## 2023-12-07 PROCEDURE — A9270 NON-COVERED ITEM OR SERVICE: HCPCS | Performed by: PHYSICIAN ASSISTANT

## 2023-12-07 PROCEDURE — 85027 COMPLETE CBC AUTOMATED: CPT

## 2023-12-07 PROCEDURE — 80048 BASIC METABOLIC PNL TOTAL CA: CPT

## 2023-12-07 PROCEDURE — 36415 COLL VENOUS BLD VENIPUNCTURE: CPT

## 2023-12-07 PROCEDURE — 700105 HCHG RX REV CODE 258

## 2023-12-07 PROCEDURE — 97535 SELF CARE MNGMENT TRAINING: CPT

## 2023-12-07 PROCEDURE — 700102 HCHG RX REV CODE 250 W/ 637 OVERRIDE(OP)

## 2023-12-07 PROCEDURE — 700111 HCHG RX REV CODE 636 W/ 250 OVERRIDE (IP)

## 2023-12-07 PROCEDURE — A9270 NON-COVERED ITEM OR SERVICE: HCPCS

## 2023-12-07 PROCEDURE — 700111 HCHG RX REV CODE 636 W/ 250 OVERRIDE (IP): Performed by: PHYSICIAN ASSISTANT

## 2023-12-07 PROCEDURE — 82962 GLUCOSE BLOOD TEST: CPT

## 2023-12-07 PROCEDURE — 97162 PT EVAL MOD COMPLEX 30 MIN: CPT

## 2023-12-07 RX ORDER — CALCIUM CARBONATE 500 MG/1
500 TABLET, CHEWABLE ORAL 2 TIMES DAILY
Status: DISCONTINUED | OUTPATIENT
Start: 2023-12-07 | End: 2023-12-07

## 2023-12-07 RX ORDER — AMOXICILLIN 250 MG
1 CAPSULE ORAL
Status: DISCONTINUED | OUTPATIENT
Start: 2023-12-07 | End: 2023-12-10 | Stop reason: HOSPADM

## 2023-12-07 RX ORDER — AMOXICILLIN 250 MG
1 CAPSULE ORAL NIGHTLY
Status: DISCONTINUED | OUTPATIENT
Start: 2023-12-07 | End: 2023-12-10 | Stop reason: HOSPADM

## 2023-12-07 RX ORDER — ENEMA 19; 7 G/133ML; G/133ML
1 ENEMA RECTAL
Status: DISCONTINUED | OUTPATIENT
Start: 2023-12-07 | End: 2023-12-10 | Stop reason: HOSPADM

## 2023-12-07 RX ORDER — DOCUSATE SODIUM 100 MG/1
100 CAPSULE, LIQUID FILLED ORAL 2 TIMES DAILY
Status: DISCONTINUED | OUTPATIENT
Start: 2023-12-07 | End: 2023-12-07

## 2023-12-07 RX ORDER — BISACODYL 10 MG
10 SUPPOSITORY, RECTAL RECTAL
Status: DISCONTINUED | OUTPATIENT
Start: 2023-12-07 | End: 2023-12-10 | Stop reason: HOSPADM

## 2023-12-07 RX ORDER — POLYETHYLENE GLYCOL 3350 17 G/17G
1 POWDER, FOR SOLUTION ORAL 2 TIMES DAILY PRN
Status: DISCONTINUED | OUTPATIENT
Start: 2023-12-07 | End: 2023-12-10 | Stop reason: HOSPADM

## 2023-12-07 RX ORDER — SODIUM CHLORIDE, SODIUM LACTATE, POTASSIUM CHLORIDE, AND CALCIUM CHLORIDE .6; .31; .03; .02 G/100ML; G/100ML; G/100ML; G/100ML
1000 INJECTION, SOLUTION INTRAVENOUS ONCE
Status: COMPLETED | OUTPATIENT
Start: 2023-12-07 | End: 2023-12-07

## 2023-12-07 RX ADMIN — CEFAZOLIN 2 G: 2 INJECTION, POWDER, FOR SOLUTION INTRAMUSCULAR; INTRAVENOUS at 05:44

## 2023-12-07 RX ADMIN — OXYCODONE HYDROCHLORIDE 10 MG: 10 TABLET ORAL at 05:31

## 2023-12-07 RX ADMIN — ONDANSETRON 4 MG: 4 TABLET, ORALLY DISINTEGRATING ORAL at 02:37

## 2023-12-07 RX ADMIN — DOCUSATE SODIUM 100 MG: 100 CAPSULE, LIQUID FILLED ORAL at 05:32

## 2023-12-07 RX ADMIN — HYDROCODONE BITARTRATE AND ACETAMINOPHEN 1 TABLET: 10; 325 TABLET ORAL at 21:10

## 2023-12-07 RX ADMIN — Medication 1 APPLICATOR: at 16:20

## 2023-12-07 RX ADMIN — DOCUSATE SODIUM 50 MG AND SENNOSIDES 8.6 MG 1 TABLET: 8.6; 5 TABLET, FILM COATED ORAL at 21:10

## 2023-12-07 RX ADMIN — METHOCARBAMOL 750 MG: 750 TABLET ORAL at 05:32

## 2023-12-07 RX ADMIN — ANTACID TABLETS 500 MG: 500 TABLET, CHEWABLE ORAL at 16:20

## 2023-12-07 RX ADMIN — ENOXAPARIN SODIUM 40 MG: 100 INJECTION SUBCUTANEOUS at 05:33

## 2023-12-07 RX ADMIN — CHOLECALCIFEROL TAB 125 MCG (5000 UNIT) 5000 UNITS: 125 TAB at 05:33

## 2023-12-07 RX ADMIN — METHOCARBAMOL 750 MG: 750 TABLET ORAL at 12:37

## 2023-12-07 RX ADMIN — METHOCARBAMOL 750 MG: 750 TABLET ORAL at 21:10

## 2023-12-07 RX ADMIN — ACETAMINOPHEN 650 MG: 325 TABLET, FILM COATED ORAL at 15:19

## 2023-12-07 RX ADMIN — SODIUM CHLORIDE, POTASSIUM CHLORIDE, SODIUM LACTATE AND CALCIUM CHLORIDE 1000 ML: 600; 310; 30; 20 INJECTION, SOLUTION INTRAVENOUS at 17:00

## 2023-12-07 RX ADMIN — ANTACID TABLETS 500 MG: 500 TABLET, CHEWABLE ORAL at 05:32

## 2023-12-07 ASSESSMENT — COGNITIVE AND FUNCTIONAL STATUS - GENERAL
DAILY ACTIVITIY SCORE: 22
SUGGESTED CMS G CODE MODIFIER MOBILITY: CJ
DRESSING REGULAR LOWER BODY CLOTHING: A LITTLE
STANDING UP FROM CHAIR USING ARMS: A LITTLE
WALKING IN HOSPITAL ROOM: A LITTLE
HELP NEEDED FOR BATHING: A LITTLE
MOBILITY SCORE: 15
CLIMB 3 TO 5 STEPS WITH RAILING: A LITTLE
TURNING FROM BACK TO SIDE WHILE IN FLAT BAD: A LOT
SUGGESTED CMS G CODE MODIFIER MOBILITY: CK
MOVING TO AND FROM BED TO CHAIR: A LOT
MOBILITY SCORE: 22
CLIMB 3 TO 5 STEPS WITH RAILING: A LITTLE
SUGGESTED CMS G CODE MODIFIER DAILY ACTIVITY: CJ
MOVING FROM LYING ON BACK TO SITTING ON SIDE OF FLAT BED: A LOT
WALKING IN HOSPITAL ROOM: A LITTLE

## 2023-12-07 ASSESSMENT — GAIT ASSESSMENTS
DEVIATION: BRADYKINETIC
GAIT LEVEL OF ASSIST: STANDBY ASSIST
DISTANCE (FEET): 200
ASSISTIVE DEVICE: FRONT WHEEL WALKER

## 2023-12-07 ASSESSMENT — PAIN DESCRIPTION - PAIN TYPE
TYPE: CHRONIC PAIN
TYPE: ACUTE PAIN
TYPE: SURGICAL PAIN
TYPE: SURGICAL PAIN

## 2023-12-07 NOTE — PROGRESS NOTES
4 Eyes Skin Assessment Completed by LETICIA Cardoza and LETICIA Pfeiffer.    Head WDL  Ears WDL  Nose WDL  Mouth WDL  Neck WDL  Breast/Chest WDL  Shoulder Blades WDL  Spine Incision 3 dressings cdi  (R) Arm/Elbow/Hand WDL  (L) Arm/Elbow/Hand WDL  Abdomen WDL  Groin WDL  Scrotum/Coccyx/Buttocks WDL  (R) Leg WDL  (L) Leg WDL  (R) Heel/Foot/Toe WDL  (L) Heel/Foot/Toe WDL          Devices In Places Nasal Cannula and Back Brace (at bedside)      Interventions In Place NC W/Ear Foams and Pillows    Possible Skin Injury No    Pictures Uploaded Into Epic N/A  Wound Consult Placed N/A  RN Wound Prevention Protocol Ordered No

## 2023-12-07 NOTE — CARE PLAN
The patient is {Patient Stability:0408225}    Shift Goals  Clinical Goals: Pain Control  Patient Goals: Comfort  Family Goals: N/A    Progress made toward(s) clinical / shift goals:  ***    Patient is not progressing towards the following goals:

## 2023-12-07 NOTE — PROGRESS NOTES
"1745 - pt back to floor. On , 3L oxymask. SCD on    1820 - on 3L NC, educated to call when he wants to rest to switch him back to oxymask, as he was dropping in PACU when asleep    No complaints of pain. States he feels drowsy from surgery still. No numbness or tingling noted. Encouraged to drink fluids, regular diet ordered and kitchen contacted. Urinal provided and pt educated to call when he urinates so it can be recorded as he received a lot of fluid in surgery.     Requests his 1800 medications moved to 2100 until after he eats because he still feels \"fuzzy.\"     A&Ox4, friend at bedside. Both updated. Skin check complete.  "

## 2023-12-07 NOTE — CARE PLAN
The patient is Stable - Low risk of patient condition declining or worsening    Shift Goals  Clinical Goals: pain control, safety, mobility, juan ramon drain  Patient Goals: sleep, pain control  Family Goals: n/a    Progress made toward(s) clinical / shift goals:    Problem: Pain - Standard  Goal: Alleviation of pain or a reduction in pain to the patient’s comfort goal  12/7/2023 0631 by Santa Vega R.N.  Outcome: Progressing  12/7/2023 0631 by Santa Vega R.N.  Outcome: Progressing     Problem: Knowledge Deficit - Standard  Goal: Patient and family/care givers will demonstrate understanding of plan of care, disease process/condition, diagnostic tests and medications  Outcome: Progressing       Patient is not progressing towards the following goals:

## 2023-12-07 NOTE — THERAPY
Physical Therapy   Initial Evaluation     Patient Name: Quan Quesada  Age:  58 y.o., Sex:  male  Medical Record #: [id number]  Today's Date: 12/7/2023     Precautions  Precautions: Fall Risk;Spinal / Back Precautions   Comments: LSO PRN per orders    Assessment  Patient is 58 y.o. male presenting to physical therapy POD 3 stage I L2-4 XLIF via left approach and POD 1 stage 2 L2-5 laminectomy, L5-S1 TLIF, and stealth L2-S1 PSF. LSO PRN per orders. Pt demonstrates slow but steady mobility limited by post operative pain. Greatest difficulty with log roll out of flat bed, friend present for session and aware of how to support pt in/out of bed. Pt tolerated ambulation x200 ft and 1 platform step negotiation with FWW and SBA for cues. Encouraged pt to ambulate frequently throughout the day as tolerated, at least 4x/day while in acute setting. Reviewed goals for ambulation distance/progressive activity tolerance upon DC home as well. No further acute PT needs at this time, cleared for DC home when medically cleared.       Please assist pt up to chair for ALL meals, use restroom vs urinal/BSC, and ambulate in hallway at least 4x/day.       Plan    Physical Therapy Initial Treatment Plan   Duration: Evaluation only    DC Equipment Recommendations: Front-Wheel Walker  Discharge Recommendations: Recommend outpatient physical therapy services to address higher level deficits     12/07/23 1029   Precautions   Precautions Fall Risk;Spinal / Back Precautions    Comments LSO PRN per orders   Vitals   O2 (LPM) 2   O2 Delivery Device Nasal Cannula   Vitals Comments initally on 3L Oxymask upon PT arrival, transitioned to NC for amblation with SpO2 maintaining 94% on 2L   Pain 0 - 10 Group   Therapist Pain Assessment During Activity;Nurse Notified  (post op back pain not rated, greatest during transitions)   Prior Living Situation   Prior Services None   Housing / Facility 1 Story House   Steps Into Home 2  (platform step)   Steps  In Home 0   Bathroom Set up Walk In Shower   Equipment Owned 4-Wheel Walker;Raised Toilet Seat With Arms   Lives with - Patient's Self Care Capacity Alone and Able to Care For Self   Comments Friend, Amina, at bedside for evaluation and plans to stay with pt to assist upon DC.   Prior Level of Functional Mobility   Bed Mobility Independent   Transfer Status Independent   Ambulation Independent   Ambulation Distance community   Assistive Devices Used 4-Wheel Walker  (intermittently for pain management)   Stairs Independent   Cognition    Cognition / Consciousness WDL   Level of Consciousness Alert   Comments pleasant and receptive to PT. Review log roll, spinal precautions and home wear of LSO and how to don/doff upon DC home. Pt and friend very receptive to education and provided with post op handout for reference   Active ROM Lower Body    Active ROM Lower Body  WDL   Strength Lower Body   Lower Body Strength  WDL   Comments adequate for functional mobility, slightly limited by pain   Balance Assessment   Sitting Balance (Static) Good   Sitting Balance (Dynamic) Fair +   Standing Balance (Static) Fair   Standing Balance (Dynamic) Fair   Weight Shift Sitting Good   Weight Shift Standing Fair   Comments w/ FWW   Bed Mobility    Supine to Sit Minimal Assist  (via log roll)   Sit to Supine   (seated in chair at end of session)   Scooting Supervised   Rolling Supervised  (with increased effort)   Comments reiterated log roll with HOB flat, no bed features   Gait Analysis   Gait Level Of Assist Standby Assist   Assistive Device Front Wheel Walker   Distance (Feet) 200   # of Times Distance was Traveled 1   Deviation Bradykinetic  (slightly forward flexed posture)   # of Stairs Climbed 1   Level of Assist with Stairs Contact Guard Assist   Weight Bearing Status no restrictions   Comments cues for approaching porch step wtih FWW and LE pain/weakness. Reiterated up with GOOD, down with BAD for pt and friend   Functional  Mobility   Sit to Stand Standby Assist   Bed, Chair, Wheelchair Transfer Standby Assist   Transfer Method Stand Step   Comments with increased effort due to apin   How much difficulty does the patient currently have...   Turning over in bed (including adjusting bedclothes, sheets and blankets)? 2   Sitting down on and standing up from a chair with arms (e.g., wheelchair, bedside commode, etc.) 2   Moving from lying on back to sitting on the side of the bed? 2   How much help from another person does the patient currently need...   Moving to and from a bed to a chair (including a wheelchair)? 3   Need to walk in a hospital room? 3   Climbing 3-5 steps with a railing? 3   6 clicks Mobility Score 15   Education Group   Education Provided Use of Assistive Device;Stair Training;Role of Physical Therapist;Spine Precautions;Brace Wear and Care   Spine Precautions Patient Response Patient;Significant Other;Acceptance;Explanation;Demonstration;Handout;Verbal Demonstration;Action Demonstration   Role of Physical Therapist Patient Response Patient;Significant Other;Acceptance;Explanation;Verbal Demonstration   Stair Training Patient Response Patient;Significant Other;Acceptance;Explanation;Demonstration;Verbal Demonstration;Action Demonstration   Use of Assistive Device Patient Response Patient;Significant Other;Acceptance;Explanation;Verbal Demonstration;Action Demonstration   Brace Wear & Care Patient Response Patient;Significant Other;Acceptance;Explanation;Demonstration;Handout;Verbal Demonstration;Action Demonstration  (for HOME use)   Physical Therapy Initial Treatment Plan    Duration Evaluation only   Problem List    Problems Pain   Anticipated Discharge Equipment and Recommendations   DC Equipment Recommendations Front-Wheel Walker   Discharge Recommendations Recommend outpatient physical therapy services to address higher level deficits

## 2023-12-07 NOTE — OP REPORT
DATE OF SERVICE:  12/06/2023     PREOPERATIVE DIAGNOSES:   1.  Lumbar spondylosis.  2.  Lumbar stenosis.  3.  Lumbar spondylolisthesis.  4.  Adult degenerative scoliosis.  5.  Lumbar radiculopathy.  6.  Neurogenic claudication.  7.  Status post L2-3, 3-4 XLIF and scoliosis correction yesterday by myself.     POSTOPERATIVE DIAGNOSES:   1.  Lumbar spondylosis.  2.  Lumbar stenosis.  3.  Lumbar spondylolisthesis.  4.  Adult degenerative scoliosis.  5.  Lumbar radiculopathy.  6.  Neurogenic claudication.  7.  Status post L2-3, 3-4 XLIF and scoliosis correction yesterday by myself.     PROCEDURES PERFORMED:    1.  Stealth guidance for the spine.  2.  Hip bone marrow autograft aspirate via separate incision.  3.  L2, L3, L4, L5, S1 stealth-guided pedicle screw fixation.  4.  L2, L3, L4, L5, S1 laminectomy, foraminotomy, decompression of thecal sac   and nerves.  5.  L5, S1 left sided additional bone work and facetectomy for delivery of   TLIF graft.  5.  L5, S1 diskectomy and titanium interbody cage placement.  6.  L5-S1 interbody/allograft/autograft arthrodesis and fusion.  7.  L2, L3, L4, L5, S1 posterolateral allograft autograft chandrika arthrodesis and   fusion.  8.  Residual scoliosis correction.  9.  Modifier 22, the patient's body mass index of 38.  Took case of normally 4   hours and made it 6 hours greater than 50% increase in time, physical and   mental effort to be dictated down below justifying modifier 22 consideration.  10.  Intraoperative monitoring.     SURGEON:  Luis Puentes III, MD     ASSISTANT:  Kelley Peralta PA-C     ANESTHESIA:  General.     COMPLICATIONS:  None.     ESTIMATED BLOOD LOSS:  700 mL, 290 mL given back via Cell Saver.     COMPLICATIONS:  None.     DRAINS LEFT:  Subfascial SWAPNA.     FINDINGS:  Solid bony fixation noted.     DISPOSITION:  Extubated to recovery and to floor.     HISTORY OF PRESENT ILLNESS:  A 58-year-old man who has undergone a 2-3, 3-4   XLIF for partial scoliosis  correction and did well a few days ago.  I   explained the risks, benefits and alternatives of a long segment decompression   and fusion for his critical stenosis and this includes pain, infection,   bleeding, CSF leak, compared to completely resolve symptoms, neurologic   deficits including pain, weakness, numbness, bladder or bowel difficulties,   failure of fixation, failure of fusion, need for rostral caudal extension due   to junctional stenosis.  He understood the risks, benefits and agreed to   consent.     SUMMARY OF OPERATIVE PROCEDURE:  The patient was brought to the operating   suite, placed under general anesthesia.  Alphatec monitoring hooked up SSEP   and EMG needles for monitoring.  Anesthesia hooked above a line and the   patient was carefully rolled into the prone position after placing a [place] on   a chest, hip, thigh Marco A table, arms up in superman position, all padded   pressure points secured.  The patient's body mass index of 38, did cause mild   multiple adjustments to be made in positioning due to settling of the body   adding an additional 10 minutes time, all adding the total case time of 6   hours justifying Modifier 22 consideration.  X-ray fluoroscopy was brought   into draw a midline L3-S1 incision as well as a separate incision for the   right PSIS Stealth post.  These were both infiltrated with Marcaine with   epinephrine.  Preoperative antibiotics were given.  Proper timeout was   performed.  The patient was prepped and draped in sterile fashion.     A linear incision was made and soft tissues dissected with bipolar and   monopolar electrocautery.  We found the dorsal fascia, incised it sharply   using subperiosteal technique, split the muscles off the L2, L3, L4, L5, S1   lamina out to the transverse process, careful to spare the L1-2 facet.  We   advanced our retractors, infiltrated the muscle with Marcaine, verified levels   with x-ray.     Stealth guidance for the spine with  hip bone marrow autograft aspirate via   separate incision:  We made a small stab incision over the right PSIS,   dissected down the bony prominence at appropriate angle, placed a small trocar   and aspirated out 60 mL of stem cell rich bone marrow blood for use later.    This was spun down with special centrifuge yielding a 9 mL mesenchymal stem   cell slurry for use for autograft.  In same angle, we placed Medtronic O-arm   reference frame pin into the patient, draped the patient appropriately, ran an   O-arm spin and all registration points for the screws were excellent.     L2, L3, L4, L5, S1 stealth-guided pedicle screw fixation:  Using the Alphatec   InVictus Stealth screws and the Medtronic O-arm instruments, made a small   corticectomy guided by the reference frame, all under Stealth, undertapped   under Stealth and bilaterally at L2, 5.5x50 mm screw on the left and a 6.5x50   mm screw on the right.  Bilaterally at L3, 7.5x60 mm screws, left L4, 8.5x40   mm screw shorter because of the XLIF screws in the way and right L4, 8.5x50 mm   screw, left L5 7.5x50 mm screw and on the right, 7.5x45 mm screw.    Bilaterally, we placed tricortical S1, 8.5x55 mm screws.  We stimulated the   screws and the stimulation parameters were less than 20 milliamps, redraped   the patient, ran an O-arm spin and all trajectories were excellent.     L2, L3, L4, L5, S1 laminectomy, foraminotomy, decompression of thecal sac and   nerves:  Using a Libra rongeur and Onelia bone cutter, the spinous process   of L2, L3, L4, L5, S1 harvested for autograft later.  Using Midas Prasanna drill,   drilled off the lamina widely into the extremely hard bone.  We drilled the   lamina of L2, L3, L4, L5, S1.  This portion took a lot longer as well from   thickening of the bone due to the body habitus all adding the total case time   of 6 hours we did carefully.  The Kerrison rongeurs can barely handle this   firmness and harvested the bone so it took  "long to do the laminectomy safely.    We got the central canal decompressing the foraminotomies at all levels.    Modifier 22 also, applied here due to the depth of the incision working from   his body habitus.  We undercut L1 to prevent junctional stenosis.  We were   happy with the central decompression.     Left L5-S1.  Additional bone work for a transforaminal lumbar interbody fusion   and facetectomy:  This we will do as additional coding since we had to do   additional drilling ___     \"DICTATION ENDS HERE\"           ______________________________  MD YUMIKO Rincon III/ETIENNE/    DD:  12/07/2023 09:59  DT:  12/07/2023 11:21    Job#:  [id number]  "

## 2023-12-07 NOTE — OP REPORT
DATE OF SERVICE:  12/06/2023     PREOPERATIVE DIAGNOSES:   1.  Lumbar spondylosis.  2.  Lumbar stenosis.  3.  Lumbar spondylolisthesis.  4.  Adult degenerative scoliosis.  5.  Lumbar radiculopathy.  6.  Neurogenic claudication.  7.  Status post L2-3, 3-4 XLIF and scoliosis correction yesterday by myself.     POSTOPERATIVE DIAGNOSES:   1.  Lumbar spondylosis.  2.  Lumbar stenosis.  3.  Lumbar spondylolisthesis.  4.  Adult degenerative scoliosis.  5.  Lumbar radiculopathy.  6.  Neurogenic claudication.  7.  Status post L2-3, 3-4 XLIF and scoliosis correction yesterday by myself.     PROCEDURES PERFORMED:    1.  Stealth guidance for the spine.  2.  Hip bone marrow autograft aspirate via separate incision.  3.  L2, L3, L4, L5, S1 stealth-guided pedicle screw fixation.  4.  L2, L3, L4, L5, S1 laminectomy, foraminotomy, decompression of thecal sac   and nerves.  5.  L5, S1 left sided additional bone work and facetectomy for delivery of   TLIF graft.  5.  L5, S1 diskectomy and titanium interbody cage placement.  6.  L5-S1 interbody/allograft/autograft arthrodesis and fusion.  7.  L2, L3, L4, L5, S1 posterolateral allograft autograft chandrika arthrodesis and   fusion.  8.  Residual scoliosis correction.  9.  Modifier 22, the patient's body mass index of 38.  Took case of normally 4   hours and made it 6 hours greater than 50% increase in time, physical and   mental effort to be dictated down below justifying modifier 22 consideration.  10.  Intraoperative monitoring.     SURGEON:  Luis Puentes III, MD     ASSISTANT:  Kelley Peralta PA-C     ANESTHESIA:  General.     COMPLICATIONS:  None.     ESTIMATED BLOOD LOSS:  700 mL, 290 mL given back via Cell Saver.     COMPLICATIONS:  None.     DRAINS LEFT:  Subfascial SWAPNA.     FINDINGS:  Solid bony fixation noted.     DISPOSITION:  Extubated to recovery and to floor.     HISTORY OF PRESENT ILLNESS:  A 58-year-old man who has undergone a 2-3, 3-4   XLIF for partial scoliosis  correction and did well a few days ago.  I   explained the risks, benefits and alternatives of a long segment decompression   and fusion for his critical stenosis and this includes pain, infection,   bleeding, CSF leak, compared to completely resolve symptoms, neurologic   deficits including pain, weakness, numbness, bladder or bowel difficulties,   failure of fixation, failure of fusion, need for rostral caudal extension due   to junctional stenosis.  He understood the risks, benefits and agreed to   consent.     SUMMARY OF OPERATIVE PROCEDURE:  The patient was brought to the operating   suite, placed under general anesthesia.  Alphatec monitoring hooked up SSEP   and EMG needles for monitoring.  Anesthesia hooked above a line and the   patient was carefully rolled into the prone position after placing a [place] on   a chest, hip, thigh Marco A table, arms up in superman position, all padded   pressure points secured.  The patient's body mass index of 38, did cause mild   multiple adjustments to be made in positioning due to settling of the body   adding an additional 10 minutes time, all adding the total case time of 6   hours justifying Modifier 22 consideration.  X-ray fluoroscopy was brought   into draw a midline L3-S1 incision as well as a separate incision for the   right PSIS Stealth post.  These were both infiltrated with Marcaine with   epinephrine.  Preoperative antibiotics were given.  Proper timeout was   performed.  The patient was prepped and draped in sterile fashion.     A linear incision was made and soft tissues dissected with bipolar and   monopolar electrocautery.  We found the dorsal fascia, incised it sharply   using subperiosteal technique, split the muscles off the L2, L3, L4, L5, S1   lamina out to the transverse process, careful to spare the L1-2 facet.  We   advanced our retractors, infiltrated the muscle with Marcaine, verified levels   with x-ray.     Stealth guidance for the spine with  hip bone marrow autograft aspirate via   separate incision:  We made a small stab incision over the right PSIS,   dissected down the bony prominence at appropriate angle, placed a small trocar   and aspirated out 60 mL of stem cell rich bone marrow blood for use later.    This was spun down with special centrifuge yielding a 9 mL mesenchymal stem   cell slurry for use for autograft.  In same angle, we placed Medtronic O-arm   reference frame pin into the patient, draped the patient appropriately, ran an   O-arm spin and all registration points for the screws were excellent.     L2, L3, L4, L5, S1 stealth-guided pedicle screw fixation:  Using the Alphatec   InVictus Stealth screws and the Medtronic O-arm instruments, made a small   corticectomy guided by the reference frame, all under Stealth, undertapped   under Stealth and bilaterally at L2, 5.5x50 mm screw on the left and a 6.5x50   mm screw on the right.  Bilaterally at L3, 7.5x60 mm screws, left L4, 8.5x40   mm screw shorter because of the XLIF screws in the way and right L4, 8.5x50 mm   screw, left L5 7.5x50 mm screw and on the right, 7.5x45 mm screw.    Bilaterally, we placed tricortical S1, 8.5x55 mm screws.  We stimulated the   screws and the stimulation parameters were less than 20 milliamps, redraped   the patient, ran an O-arm spin and all trajectories were excellent.     L2, L3, L4, L5, S1 laminectomy, foraminotomy, decompression of thecal sac and   nerves:  Using a Libra rongeur and Onelia bone cutter, the spinous process   of L2, L3, L4, L5, S1 harvested for autograft later.  Using Midas Prasanna drill,   drilled off the lamina widely into the extremely hard bone.  We drilled the   lamina of L2, L3, L4, L5, S1.  This portion took a lot longer as well from   thickening of the bone due to the body habitus all adding the total case time   of 6 hours we did carefully.  The [place] rongeurs can barely handle this   firmness and harvested the bone so it took  long to do the laminectomy safely.    We got the central canal decompressing the foraminotomies at all levels.    Modifier 22 also, applied here due to the depth of the incision working from   his body habitus.  We undercut L1 to prevent junctional stenosis.  We were   happy with the central decompression.     Left L5-S1 Additional bone work for a transforaminal lumbar interbody fusion   and facetectomy:  This we will do as additional coding since we had to do   additional drillin to gain access to disc space for TLIF graft delivery. This was donw with protecting the  nerve root and drilling with Midas Prasanna to perform 3/4 facetectomy to gain access angle to disc space to minimize nerve root retraction during the next step. Epidural veins were bipolared back, and the vental thecal sac was mobilized.      L2, L3, L4, L5 and S1 posterolateral autograft/chandrika arthrodesis and fusion, with degenerative scoliosis correction:  We   decorticated the transverse process, lateral facets of L2, L3, L4, L5 and S1   and additional work had to be done to pull the copious tissue due to a BMI of   38, all adding to modifier 22, consideration for a total case time of nearly 6   hours.  At each step, we laid down patient's morselized autograft and bone   chip allograft soaked in bone marrow aspirate autograft for onlay fusion   arthrodesis.  This was supplemented with a 5.5 lordotic titanium alloy chandrika,   bent with lordosis, laid neutrally in the setscrews we performed compression   across L5-S1 and then compression on the right at L2, L3, L4 and then   distraction at L2, L3, L4 to do final alignment of the scoliosis. The maneuevers require level of risk and expertise to prevent neurologic injury, justifying deformity correction coding.  We locked   this all down with 's torque devices.  We did a crosslink and we   were very happy with our final construct.     We copiously irrigated with bacitracin-infused saline:  We tunneled  a 7.5   fluted SWAPNA and secured to skin with stitch.  There was no change in SSEPs or   EMGs of the scoliosis correction.  We closed the wound in anatomic layers and   again the modifier 22 added additional time here.  We closed the muscle with 0   Vicryl, fascia with 0 Vicryl, dermis with 2-0 Vicryl and staples for the   skin.  Sterile dressing was applied.  The patient rolled from prone to supine.    [place] removed and extubated.     There were no complications.  Needle and sponge count correct at the end of   the case.        ______________________________  MD YUMIKO Rincon III/RAMYA/    DD:  12/07/2023 10:01  DT:  12/07/2023 11:30    Job#:  [id number]

## 2023-12-07 NOTE — DISCHARGE PLANNING
Per IDT rounds patient is not yet medically cleared. SWAPNA drain in R. Hip still producing high output. PT recommending outpatient therapy with [facility]. Patient owns [facility] at home. OT recommending HH. RN CM to discuss with IDT about final recommendations (home health vs. Outpatient therapy)    Case Management Discharge Planning    Admission Date: 12/4/2023  GMLOS: 3  ALOS: 3    6-Clicks ADL Score: 22  6-Clicks Mobility Score: 22      Anticipated Discharge Dispo: Discharge Disposition: Discharged to home/self care (01)  Discharge Address: [address] [place], [place] [address] [place] [address]  Discharge Contact Phone Number: [phone]    DME Needed: No    Action(s) Taken: Updated Provider/Nurse on Discharge Plan    Escalations Completed: None    Medically Clear: No    Next Steps: Pending medical clearance (high output in SWAPNA drain)    Barriers to Discharge: Medical clearance and Pending PT Evaluation

## 2023-12-07 NOTE — OR NURSING
Pt is no longer having apnea episodes. O2 sats >90% on 6 L O2 via oxymask. Dr. Rios came to bedside to assess pt and states he is ok to return to his room. Report called to LETICIA Cardoza and pt transported back to [facility].

## 2023-12-07 NOTE — PROGRESS NOTES
Neurosurgery Progress Note    Subjective:  POD#3 stage I L2-4 XLIF via left approach  POD#1 L2-5 laminectomy, L5-S1 TLIF, and stealth L2-S1 PSF    Doing well this AM, tolerable back pain.  Some left hip/anterior thigh discomfort related to XLIF  Ambulating some, voiding, +small BM    Drain with 160cc output last 8 hrs      Exam:  Pleasant and cooperative  Dressing c/d/I  BLE strength 5/5 throughout  Sensation intact throughout      BP  Min: 94/58  Max: 139/71  Pulse  Av.8  Min: 87  Max: 109  Resp  Av.4  Min: 12  Max: 20  Temp  Av.7 °C (98 °F)  Min: 35.9 °C (96.6 °F)  Max: 37.2 °C (99 °F)  SpO2  Av.3 %  Min: 91 %  Max: 98 %    No data recorded    Recent Labs     23  0503 23  0348 23  0327   WBC 11.6* 9.5 11.6*   RBC 4.78 4.49* 3.63*   HEMOGLOBIN 15.0 14.2 11.4*   HEMATOCRIT 45.8 43.1 35.0*   MCV 95.8 96.0 96.4   MCH 31.4 31.6 31.4   MCHC 32.8 32.9 32.6   RDW 47.1 47.8 46.5   PLATELETCT 208 181 171   MPV 9.7 9.8 10.0       Recent Labs     23  0503 23  0348 23  0327   SODIUM 140 139 134*   POTASSIUM 4.5 4.3 4.5   CHLORIDE 104 103 99   CO2 26 29 29   GLUCOSE 149* 102* 156*   BUN 15 15 22   CREATININE 0.74 0.67 0.83   CALCIUM 8.4* 8.9 7.7*                 Intake/Output                         23 07 - 23 0659 23 - 23 Total  Total                 Intake    P.O.  480  -- 480  --  -- --    P.O. 480 -- 480 -- -- --    I.V.    --   --  -- --    Volume (mL) (Lactated Ringers) 2000 -- -- --    Blood  230  -- 230  --  -- --    Cell Saver 230 -- 230 -- -- --    Total Intake 2710 -- 2710 -- -- --       Output    Urine  200  -- 200  --  -- --    Urine Void (mL) 200 -- 200 -- -- --    Drains  460  280 740  155  -- 155    Output (mL) (Closed/Suction Drain 1 Inferior Back Marco A James 7 Fr.) 460 280 740 155 -- 155    Stool  --  -- --  --  -- --    Number of Times Stooled -- -- -- 0 x --  0 x    Blood  700  -- 700  --  -- --    Est. Blood Loss 700 -- 700 -- -- --    Total Output 0080 314 7702 155 -- 155       Net I/O     1350 -280 1070 -155 -- -155              Intake/Output Summary (Last 24 hours) at 12/7/2023 1235  Last data filed at 12/7/2023 1207  Gross per 24 hour   Intake 480 ml   Output 1595 ml   Net -1115 ml               Pharmacy Consult Request  1 Each PHARMACY TO DOSE    docusate sodium  100 mg BID    senna-docusate  1 Tablet Nightly    senna-docusate  1 Tablet Q24HRS PRN    polyethylene glycol/lytes  1 Packet BID PRN    magnesium hydroxide  30 mL QDAY PRN    bisacodyl  10 mg Q24HRS PRN    sodium phosphate  1 Each Once PRN    benzocaine-menthol  1 Lozenge Q2HRS PRN    calcium carbonate  500 mg BID    Nozin nasal  swab  1 Applicator BID    HYDROcodone/acetaminophen  1 Tablet Q4HRS PRN    methocarbamol  750 mg Q8HRS    MD ALERT...DO NOT ADMINISTER NSAIDS or ASPIRIN unless ORDERED By Neurosurgery  1 Each PRN    enoxaparin (LOVENOX) injection  40 mg Daily-0600    diphenhydrAMINE  25 mg Q6HRS PRN    Or    diphenhydrAMINE  25 mg Q6HRS PRN    temazepam  15 mg HS PRN - MR X 1    gabapentin  300 mg Q EVENING    Pharmacy Consult Request  1 Each PHARMACY TO DOSE    docusate sodium  100 mg BID    senna-docusate  1 Tablet Nightly    senna-docusate  1 Tablet Q24HRS PRN    polyethylene glycol/lytes  1 Packet BID PRN    magnesium hydroxide  30 mL QDAY PRN    bisacodyl  10 mg Q24HRS PRN    sodium phosphate  1 Each Once PRN    NS   Continuous    acetaminophen  650 mg Q4HRS PRN    oxyCODONE immediate-release  5 mg Q4HRS PRN    Or    oxyCODONE immediate-release  10 mg Q4HRS PRN    HYDROmorphone  0.5 mg Q3HRS PRN    ondansetron  4 mg Q4HRS PRN    ondansetron  4 mg Q4HRS PRN    promethazine  12.5-25 mg Q4HRS PRN    promethazine  12.5-25 mg Q4HRS PRN    prochlorperazine  5-10 mg Q4HRS PRN    labetalol  10 mg Q HOUR PRN    benzocaine-menthol  1 Lozenge Q2HRS PRN    calcium carbonate  500 mg BID     vitamin D3  5,000 Units DAILY       Assessment and Plan:  Hospital day # 4  POD# 3/1  Doing well today  OOB with PT/OT  Ice incisions regularly  Continue Pain control  Keep drain in today  Likely home Friday or Saturday    Chemical prophylactic DVT therapy: Yes Lovenox 40mg QD  Start date/time: 12/7

## 2023-12-08 LAB
ANION GAP SERPL CALC-SCNC: 7 MMOL/L (ref 7–16)
BUN SERPL-MCNC: 16 MG/DL (ref 8–22)
CALCIUM SERPL-MCNC: 7.7 MG/DL (ref 8.5–10.5)
CHLORIDE SERPL-SCNC: 99 MMOL/L (ref 96–112)
CO2 SERPL-SCNC: 29 MMOL/L (ref 20–33)
CREAT SERPL-MCNC: 0.64 MG/DL (ref 0.5–1.4)
ERYTHROCYTE [DISTWIDTH] IN BLOOD BY AUTOMATED COUNT: 45.7 FL (ref 35.9–50)
GFR SERPLBLD CREATININE-BSD FMLA CKD-EPI: 110 ML/MIN/1.73 M 2
GLUCOSE SERPL-MCNC: 143 MG/DL (ref 65–99)
HCT VFR BLD AUTO: 29.2 % (ref 42–52)
HCT VFR BLD AUTO: 30.1 % (ref 42–52)
HCT VFR BLD AUTO: 30.4 % (ref 42–52)
HCT VFR BLD AUTO: 30.9 % (ref 42–52)
HGB BLD-MCNC: 9.5 G/DL (ref 14–18)
HGB BLD-MCNC: 9.8 G/DL (ref 14–18)
HGB BLD-MCNC: 9.8 G/DL (ref 14–18)
HGB BLD-MCNC: 9.9 G/DL (ref 14–18)
MCH RBC QN AUTO: 31 PG (ref 27–33)
MCHC RBC AUTO-ENTMCNC: 32 G/DL (ref 32.3–36.5)
MCV RBC AUTO: 96.9 FL (ref 81.4–97.8)
PLATELET # BLD AUTO: 158 K/UL (ref 164–446)
PMV BLD AUTO: 9.9 FL (ref 9–12.9)
POTASSIUM SERPL-SCNC: 4.3 MMOL/L (ref 3.6–5.5)
RBC # BLD AUTO: 3.19 M/UL (ref 4.7–6.1)
SODIUM SERPL-SCNC: 135 MMOL/L (ref 135–145)
WBC # BLD AUTO: 9.3 K/UL (ref 4.8–10.8)

## 2023-12-08 PROCEDURE — 85014 HEMATOCRIT: CPT

## 2023-12-08 PROCEDURE — 700111 HCHG RX REV CODE 636 W/ 250 OVERRIDE (IP): Mod: JZ

## 2023-12-08 PROCEDURE — 36415 COLL VENOUS BLD VENIPUNCTURE: CPT

## 2023-12-08 PROCEDURE — 85018 HEMOGLOBIN: CPT | Mod: 91

## 2023-12-08 PROCEDURE — 80048 BASIC METABOLIC PNL TOTAL CA: CPT

## 2023-12-08 PROCEDURE — 770001 HCHG ROOM/CARE - MED/SURG/GYN PRIV*

## 2023-12-08 PROCEDURE — 700102 HCHG RX REV CODE 250 W/ 637 OVERRIDE(OP): Performed by: PHYSICIAN ASSISTANT

## 2023-12-08 PROCEDURE — 85027 COMPLETE CBC AUTOMATED: CPT

## 2023-12-08 PROCEDURE — A9270 NON-COVERED ITEM OR SERVICE: HCPCS | Performed by: PHYSICIAN ASSISTANT

## 2023-12-08 PROCEDURE — 700102 HCHG RX REV CODE 250 W/ 637 OVERRIDE(OP)

## 2023-12-08 PROCEDURE — A9270 NON-COVERED ITEM OR SERVICE: HCPCS

## 2023-12-08 RX ADMIN — DOCUSATE SODIUM 50 MG AND SENNOSIDES 8.6 MG 1 TABLET: 8.6; 5 TABLET, FILM COATED ORAL at 21:32

## 2023-12-08 RX ADMIN — METHOCARBAMOL 750 MG: 750 TABLET ORAL at 21:33

## 2023-12-08 RX ADMIN — DOCUSATE SODIUM 100 MG: 100 CAPSULE, LIQUID FILLED ORAL at 04:04

## 2023-12-08 RX ADMIN — Medication 1 APPLICATOR: at 18:59

## 2023-12-08 RX ADMIN — METHOCARBAMOL 750 MG: 750 TABLET ORAL at 04:04

## 2023-12-08 RX ADMIN — DOCUSATE SODIUM 100 MG: 100 CAPSULE, LIQUID FILLED ORAL at 18:59

## 2023-12-08 RX ADMIN — CHOLECALCIFEROL TAB 125 MCG (5000 UNIT) 5000 UNITS: 125 TAB at 04:05

## 2023-12-08 RX ADMIN — OXYCODONE HYDROCHLORIDE 10 MG: 10 TABLET ORAL at 21:33

## 2023-12-08 RX ADMIN — POLYETHYLENE GLYCOL 3350 1 PACKET: 17 POWDER, FOR SOLUTION ORAL at 04:05

## 2023-12-08 RX ADMIN — BISACODYL 10 MG: 10 SUPPOSITORY RECTAL at 11:02

## 2023-12-08 RX ADMIN — METHOCARBAMOL 750 MG: 750 TABLET ORAL at 14:46

## 2023-12-08 RX ADMIN — Medication 1 APPLICATOR: at 04:07

## 2023-12-08 RX ADMIN — ANTACID TABLETS 500 MG: 500 TABLET, CHEWABLE ORAL at 18:59

## 2023-12-08 RX ADMIN — ACETAMINOPHEN 650 MG: 325 TABLET, FILM COATED ORAL at 04:04

## 2023-12-08 RX ADMIN — ENOXAPARIN SODIUM 40 MG: 100 INJECTION SUBCUTANEOUS at 04:05

## 2023-12-08 RX ADMIN — GABAPENTIN 300 MG: 300 CAPSULE ORAL at 18:59

## 2023-12-08 RX ADMIN — ANTACID TABLETS 500 MG: 500 TABLET, CHEWABLE ORAL at 04:04

## 2023-12-08 ASSESSMENT — PAIN DESCRIPTION - PAIN TYPE
TYPE: SURGICAL PAIN
TYPE: CHRONIC PAIN;SURGICAL PAIN

## 2023-12-08 NOTE — CARE PLAN
The patient is Stable - Low risk of patient condition declining or worsening    Shift Goals  Clinical Goals: pain control, safety, mobility  Patient Goals: pain control, sleep  Family Goals: n/a    Progress made toward(s) clinical / shift goals:    Problem: Pain - Standard  Goal: Alleviation of pain or a reduction in pain to the patient’s comfort goal  Outcome: Progressing     Problem: Knowledge Deficit - Standard  Goal: Patient and family/care givers will demonstrate understanding of plan of care, disease process/condition, diagnostic tests and medications  Outcome: Progressing       Patient is not progressing towards the following goals:

## 2023-12-08 NOTE — PROGRESS NOTES
"Patient Noted to be Diaphoretic, Decreasing Blood Pressure, Tachycardic, Severe Headache     FSBG @ 154  Jovanna GARG Notified at 1628  Marily Arias RN Notified @ 1628     Bolus Infusion Order Received     Bolus Administered     Patient Reports Decreasing Headache    /69   Pulse 97   Temp 37.3 °C (99.1 °F) (Temporal)   Resp 16   Ht 1.803 m (5' 11\")   Wt (!) 125 kg (276 lb 0.3 oz)   SpO2 99%   BMI 38.50 kg/m²  @1650        "

## 2023-12-08 NOTE — PROGRESS NOTES
Neurosurgery Progress Note    Subjective:  POD#4 stage I L2-4 XLIF via left approach  POD#2 L2-5 laminectomy, L5-S1 TLIF, and stealth L2-S1 PSF    Doing well this AM, tolerable back pain.  Some left hip/anterior thigh discomfort related to XLIF  Ambulating some, voiding, +small BM    Drain with 80cc output last 8 hrs  Bout of hypotension 12/7 pm treated with NS bolus      Exam:  Pleasant and cooperative  Dressing c/d/I  BLE strength 5/5 throughout  Sensation intact throughout      BP  Min: 106/83  Max: 147/79  Pulse  Av.3  Min: 88  Max: 111  Resp  Av.7  Min: 16  Max: 18  Temp  Av.7 °C (98.1 °F)  Min: 36.1 °C (97 °F)  Max: 37.3 °C (99.1 °F)  SpO2  Av.7 %  Min: 95 %  Max: 99 %    No data recorded    Recent Labs     23  0618   WBC 9.5 11.6* 9.3  --    RBC 4.49* 3.63* 3.19*  --    HEMOGLOBIN 14.2 11.4* 9.9* 9.8*   HEMATOCRIT 43.1 35.0* 30.9* 30.4*   MCV 96.0 96.4 96.9  --    MCH 31.6 31.4 31.0  --    MCHC 32.9 32.6 32.0*  --    RDW 47.8 46.5 45.7  --    PLATELETCT 181 171 158*  --    MPV 9.8 10.0 9.9  --        Recent Labs     23  0327 23  0210   SODIUM 139 134* 135   POTASSIUM 4.3 4.5 4.3   CHLORIDE 103 99 99   CO2 29 29 29   GLUCOSE 102* 156* 143*   BUN 15 22 16   CREATININE 0.67 0.83 0.64   CALCIUM 8.9 7.7* 7.7*                 Intake/Output                         23 0700 - 2359 23 - 23 Total  Total                 Intake    Total Intake -- -- -- -- -- --       Output    Urine  --  1200 1200  --  -- --    Number of Times Voided -- 3 x 3 x 1 x -- 1 x    Urine Void (mL) -- 1200 1200 -- -- --    Drains  335  330 665  100  -- 100    Output (mL) (Closed/Suction Drain 1 Inferior Back Marco A James 7 Fr.) 335 638 665 100 -- 100    Stool  --  -- --  --  -- --    Number of Times Stooled 0 x -- 0 x -- -- --    Total Output 335 6899 2505 100 -- 100        Net I/O     -335 -1530 -1865 -100 -- -100              Intake/Output Summary (Last 24 hours) at 12/8/2023 1228  Last data filed at 12/8/2023 1200  Gross per 24 hour   Intake --   Output 1810 ml   Net -1810 ml         $ Bladder Scan Results (mL): 504     Pharmacy Consult Request  1 Each PHARMACY TO DOSE    senna-docusate  1 Tablet Nightly    senna-docusate  1 Tablet Q24HRS PRN    polyethylene glycol/lytes  1 Packet BID PRN    magnesium hydroxide  30 mL QDAY PRN    bisacodyl  10 mg Q24HRS PRN    sodium phosphate  1 Each Once PRN    benzocaine-menthol  1 Lozenge Q2HRS PRN    Nozin nasal  swab  1 Applicator BID    HYDROcodone/acetaminophen  1 Tablet Q4HRS PRN    methocarbamol  750 mg Q8HRS    MD ALERT...DO NOT ADMINISTER NSAIDS or ASPIRIN unless ORDERED By Neurosurgery  1 Each PRN    enoxaparin (LOVENOX) injection  40 mg Daily-0600    diphenhydrAMINE  25 mg Q6HRS PRN    Or    diphenhydrAMINE  25 mg Q6HRS PRN    temazepam  15 mg HS PRN - MR X 1    gabapentin  300 mg Q EVENING    docusate sodium  100 mg BID    NS   Continuous    acetaminophen  650 mg Q4HRS PRN    oxyCODONE immediate-release  5 mg Q4HRS PRN    Or    oxyCODONE immediate-release  10 mg Q4HRS PRN    HYDROmorphone  0.5 mg Q3HRS PRN    ondansetron  4 mg Q4HRS PRN    ondansetron  4 mg Q4HRS PRN    promethazine  12.5-25 mg Q4HRS PRN    promethazine  12.5-25 mg Q4HRS PRN    prochlorperazine  5-10 mg Q4HRS PRN    labetalol  10 mg Q HOUR PRN    calcium carbonate  500 mg BID    vitamin D3  5,000 Units DAILY       Assessment and Plan:  Hospital day # 5  POD# 4/2  Doing well today  OOB with PT/OT  Ice incisions regularly  Continue Pain control  Keep drain in today  Likely home Saturday or Sunday    Chemical prophylactic DVT therapy: Yes Lovenox 40mg QD  Start date/time: 12/7

## 2023-12-09 VITALS
DIASTOLIC BLOOD PRESSURE: 73 MMHG | TEMPERATURE: 98.1 F | BODY MASS INDEX: 38.64 KG/M2 | SYSTOLIC BLOOD PRESSURE: 122 MMHG | HEART RATE: 103 BPM | OXYGEN SATURATION: 90 % | RESPIRATION RATE: 16 BRPM | HEIGHT: 71 IN | WEIGHT: 276.02 LBS

## 2023-12-09 LAB
HCT VFR BLD AUTO: 28.5 % (ref 42–52)
HCT VFR BLD AUTO: 29.4 % (ref 42–52)
HCT VFR BLD AUTO: 29.7 % (ref 42–52)
HGB BLD-MCNC: 9.4 G/DL (ref 14–18)
HGB BLD-MCNC: 9.5 G/DL (ref 14–18)
HGB BLD-MCNC: 9.7 G/DL (ref 14–18)

## 2023-12-09 PROCEDURE — 700102 HCHG RX REV CODE 250 W/ 637 OVERRIDE(OP): Performed by: PHYSICIAN ASSISTANT

## 2023-12-09 PROCEDURE — 36415 COLL VENOUS BLD VENIPUNCTURE: CPT

## 2023-12-09 PROCEDURE — A9270 NON-COVERED ITEM OR SERVICE: HCPCS

## 2023-12-09 PROCEDURE — 700111 HCHG RX REV CODE 636 W/ 250 OVERRIDE (IP): Mod: JZ

## 2023-12-09 PROCEDURE — 85018 HEMOGLOBIN: CPT

## 2023-12-09 PROCEDURE — A9270 NON-COVERED ITEM OR SERVICE: HCPCS | Performed by: PHYSICIAN ASSISTANT

## 2023-12-09 PROCEDURE — 700102 HCHG RX REV CODE 250 W/ 637 OVERRIDE(OP)

## 2023-12-09 PROCEDURE — 85014 HEMATOCRIT: CPT | Mod: 91

## 2023-12-09 RX ORDER — METHOCARBAMOL 750 MG/1
750 TABLET, FILM COATED ORAL EVERY 8 HOURS PRN
Qty: 90 TABLET | Refills: 0 | Status: SHIPPED | OUTPATIENT
Start: 2023-12-09 | End: 2024-01-08

## 2023-12-09 RX ORDER — OXYCODONE HYDROCHLORIDE 5 MG/1
5 TABLET ORAL EVERY 4 HOURS PRN
Qty: 42 TABLET | Refills: 0 | Status: SHIPPED | OUTPATIENT
Start: 2023-12-09 | End: 2023-12-16

## 2023-12-09 RX ADMIN — ANTACID TABLETS 500 MG: 500 TABLET, CHEWABLE ORAL at 04:40

## 2023-12-09 RX ADMIN — GABAPENTIN 300 MG: 300 CAPSULE ORAL at 17:36

## 2023-12-09 RX ADMIN — CHOLECALCIFEROL TAB 125 MCG (5000 UNIT) 5000 UNITS: 125 TAB at 04:40

## 2023-12-09 RX ADMIN — ANTACID TABLETS 500 MG: 500 TABLET, CHEWABLE ORAL at 17:37

## 2023-12-09 RX ADMIN — Medication 1 APPLICATOR: at 04:40

## 2023-12-09 RX ADMIN — BISACODYL 10 MG: 10 SUPPOSITORY RECTAL at 21:46

## 2023-12-09 RX ADMIN — ACETAMINOPHEN 650 MG: 325 TABLET, FILM COATED ORAL at 17:36

## 2023-12-09 RX ADMIN — METHOCARBAMOL 750 MG: 750 TABLET ORAL at 17:35

## 2023-12-09 RX ADMIN — DOCUSATE SODIUM 100 MG: 100 CAPSULE, LIQUID FILLED ORAL at 04:40

## 2023-12-09 RX ADMIN — METHOCARBAMOL 750 MG: 750 TABLET ORAL at 04:40

## 2023-12-09 RX ADMIN — ENOXAPARIN SODIUM 40 MG: 100 INJECTION SUBCUTANEOUS at 04:41

## 2023-12-09 RX ADMIN — DOCUSATE SODIUM 100 MG: 100 CAPSULE, LIQUID FILLED ORAL at 17:36

## 2023-12-09 RX ADMIN — POLYETHYLENE GLYCOL 3350 1 PACKET: 17 POWDER, FOR SOLUTION ORAL at 04:43

## 2023-12-09 RX ADMIN — Medication 1 APPLICATOR: at 17:37

## 2023-12-09 ASSESSMENT — PAIN DESCRIPTION - PAIN TYPE
TYPE: ACUTE PAIN
TYPE: SURGICAL PAIN
TYPE: ACUTE PAIN
TYPE: ACUTE PAIN
TYPE: ACUTE PAIN;SURGICAL PAIN
TYPE: SURGICAL PAIN

## 2023-12-09 NOTE — CARE PLAN
The patient is Stable - Low risk of patient condition declining or worsening    Shift Goals  Clinical Goals: mobility, pain management  Patient Goals: sleep, pain control  Family Goals: n/a    Progress made toward(s) clinical / shift goals:    Problem: Pain - Standard  Goal: Alleviation of pain or a reduction in pain to the patient’s comfort goal  Outcome: Progressing     Problem: Knowledge Deficit - Standard  Goal: Patient and family/care givers will demonstrate understanding of plan of care, disease process/condition, diagnostic tests and medications  Outcome: Progressing     Problem: Mobility  Goal: Patient's capacity to carry out activities will improve  Outcome: Progressing       Patient is not progressing towards the following goals:

## 2023-12-09 NOTE — DISCHARGE INSTRUCTIONS
DISCHARGE INSTRUCTIONS:  The patient is to ambulate as tolerated.  He is to   ice the back regularly.  He is to avoid pushing, pulling, or lifting greater   than 10 pounds.  He can take a shower.  He is to not submerge the wound.  It   will be okay for him to drive when he is comfortable not taking oral pain meds   during the day.  He has a postoperative appointment scheduled in 2 weeks'   time at the [facility].  He will contact our office with any questions or   concerns.

## 2023-12-09 NOTE — DISCHARGE SUMMARY
DATE OF ADMISSION:  12/04/2023   DATE OF DISCHARGE:  12/09/2023     DATE OF ADMISSION:  12/04/2023     DATE OF DISCHARGE:  12/09/2023     DISCHARGE DIAGNOSES:  1.  Lumbar spondylosis.  2.  Lumbar stenosis.  3.  Lumbar spondylolisthesis.  4.  Lumbar radiculopathy.  5.  Adult degenerative scoliosis.     PROCEDURES PERFORMED:  Stage I surgery L2-L4 XLIF, stage II surgery L2-L5   laminectomy, L5-S1 TLIF and Stealth-guided L2-S1 posterior spinal fusion.     COMPLICATIONS:  None.     REASON FOR ADMISSION:  This is a very pleasant 58-year-old male with the above   diagnoses.  He tried and failed extensive conservative measures.  He   understood risks versus benefits and treatment alternatives, he elected to   proceed with both procedures.     HOSPITAL COURSE:  The patient was admitted on the day of stage I surgery.  He   underwent both surgeries without complication.  By postoperative day 5/3, he   is ambulating well with a walker.  Pain is well controlled on oral pain meds.    Drain is removed.  Incision is clean, dry, and intact.  At this point, he is   stable and cleared for discharge home after uneventful hospital stay.     DISCHARGE INSTRUCTIONS:  The patient is to ambulate as tolerated.  He is to   ice the back regularly.  He is to avoid pushing, pulling, or lifting greater   than 10 pounds.  He can take a shower.  He is to not submerge the wound.  It   will be okay for him to drive when he is comfortable not taking oral pain meds   during the day.  He has a postoperative appointment scheduled in 2 weeks'   time at the [facility].  He will contact our office with any questions or   concerns.     DISCHARGE MEDICATIONS:  1.  Oxycodone, #42.  2. Methocarbamol 750 mg, #90.     All the patient's questions have been answered.  He is discharged home in   stable condition.        ______________________________  Romel Nugent PAC        ______________________________  MD ALBERT Rincon III/KALANI    DD:   12/09/2023 10:11  DT:  12/09/2023 12:05    Job#:  [id number]

## 2023-12-09 NOTE — CARE PLAN
The patient is Stable - Low risk of patient condition declining or worsening    Shift Goals  Clinical Goals: BM, mobility, pain mgmt  Patient Goals: sleep, BM  Family Goals: not present    Progress made toward(s) clinical / shift goals:    Problem: Mobility  Goal: Patient's capacity to carry out activities will improve  Outcome: Progressing  Flowsheets  Taken 12/8/2023 1959 by Amber Calvert R.N.  Mobility:   Encouraged mobilization per interdisciplinary team recommendations   Monitored for signs of activity intolerance   Provided assistive devices   Provided rest periods between activities   Administered pain management to allow progressive mobilization   Collaborated with PT/OT  Level of Mobility: Level IV  Activity Performed: Ambulate  Time Activity Tolerated: 20 min  # of Times Distance was Traveled: 500  Taken 12/8/2023 1204 by Berna Cross, EMT-Basic  Assistance: Standby Assist       Patient is not progressing towards the following goals:

## 2023-12-10 NOTE — CARE PLAN
The patient is Stable - Low risk of patient condition declining or worsening    Shift Goals  Clinical Goals: mobility, discharge, pain management  Patient Goals: BM, mobility  Family Goals: comfort    Progress made toward(s) clinical / shift goals:    Problem: Knowledge Deficit - Standard  Goal: Patient and family/care givers will demonstrate understanding of plan of care, disease process/condition, diagnostic tests and medications  Note: Discussed plan of care and discharge planning with patient. Patient encouraged to communicate needs/questions to staff as needed. Call light is within reach. Patient is A+O x 4 and verbalized understanding.      Problem: Mobility  Goal: Patient's capacity to carry out activities will improve  Flowsheets (Taken 12/9/2023 6571)  Mobility:   Encouraged mobilization per interdisciplinary team recommendations   Monitored for signs of activity intolerance   Provided assistive devices   Provided rest periods between activities   Administered pain management to allow progressive mobilization   Collaborated with PT/OT  Level of Mobility: Level IV  Activity Performed: Ambulate  Time Activity Tolerated: 60 min  Distance Per Occurrence (ft.): 500 feet  # of Times Distance was Traveled: 2  Assistance: Standby Assist       Patient is not progressing towards the following goals:

## 2023-12-10 NOTE — PROGRESS NOTES
Patient A&Ox4. Patient refused night meds but he asks for suppository. Suppository administered.   Patient still waiting for a ride.  Water and call light within reach.

## 2023-12-12 ENCOUNTER — TELEPHONE (OUTPATIENT)
Dept: HEALTH INFORMATION MANAGEMENT | Facility: OTHER | Age: 58
End: 2023-12-12
Payer: COMMERCIAL

## 2024-02-12 NOTE — DOCUMENTATION QUERY
[facility]                                                                       Query Response Note      PATIENT:               ZAMZAM CARREON  ACCT #:                  [id number]  MRN:                     [id number]  :                      1965  ADMIT DATE:       2023 8:46 AM  DISCH DATE:        2023 10:10 PM  RESPONDING  PROVIDER #:        [id number]           QUERY TEXT:    Interbody cage placement is indicated in the OR note however, in the body of the report this is not supported.     Please clarify the clinical/diagnostic relevance for the documented findings.    NOTE: If the appropriate response is not listed below, please respond with a new note.      Zonia Mathews [facility], [facility]  [email or web address]    The patient's clinical indicators include:  Clinical Indicators: Fusion with interbody cage placement on     Treatmeant:  laid down patient's morselized autograft and bone chip allograft soaked in bone marrow aspirate autograft for onlay fusion arthrodesis.  OP report listed procedures performed as:    5.  L5, S1 diskectomy and titanium interbody cage placement.    Risk Factors: spinal instability and degeneration  Options provided:   -- Interbody cage placement at L5-S1 level was done   -- Interbody cage placement was not done at L5-S1 level      Query created by: Zonia Mathews on 2024 7:06 AM    RESPONSE TEXT:    Interbody cage placement at L5-S1 level was done          Electronically signed by:  SHANTHI LAZARO III, MD 2024 10:32 AM

## 2024-10-19 PROCEDURE — RXMED WILLOW AMBULATORY MEDICATION CHARGE: Performed by: INTERNAL MEDICINE

## 2024-10-21 ENCOUNTER — PHARMACY VISIT (OUTPATIENT)
Dept: PHARMACY | Facility: MEDICAL CENTER | Age: 59
End: 2024-10-21
Payer: COMMERCIAL

## 2025-03-25 ENCOUNTER — APPOINTMENT (OUTPATIENT)
Dept: URBAN - METROPOLITAN AREA CLINIC 4 | Facility: CLINIC | Age: 60
Setting detail: DERMATOLOGY
End: 2025-03-25

## 2025-03-25 DIAGNOSIS — L81.4 OTHER MELANIN HYPERPIGMENTATION: ICD-10-CM

## 2025-03-25 DIAGNOSIS — D22 MELANOCYTIC NEVI: ICD-10-CM

## 2025-03-25 DIAGNOSIS — L82.1 OTHER SEBORRHEIC KERATOSIS: ICD-10-CM

## 2025-03-25 DIAGNOSIS — L91.8 OTHER HYPERTROPHIC DISORDERS OF THE SKIN: ICD-10-CM

## 2025-03-25 PROBLEM — D22.5 MELANOCYTIC NEVI OF TRUNK: Status: ACTIVE | Noted: 2025-03-25

## 2025-03-25 PROBLEM — D48.5 NEOPLASM OF UNCERTAIN BEHAVIOR OF SKIN: Status: ACTIVE | Noted: 2025-03-25

## 2025-03-25 PROCEDURE — ? COUNSELING

## 2025-03-25 PROCEDURE — 99202 OFFICE O/P NEW SF 15 MIN: CPT | Mod: 25

## 2025-03-25 PROCEDURE — ? BIOPSY BY SHAVE METHOD

## 2025-03-25 PROCEDURE — 11102 TANGNTL BX SKIN SINGLE LES: CPT

## 2025-03-25 PROCEDURE — ? SKIN TAG REMOVAL MULTI (COSMETIC)

## 2025-03-25 ASSESSMENT — LOCATION ZONE DERM
LOCATION ZONE: TRUNK
LOCATION ZONE: AXILLAE
LOCATION ZONE: FACE
LOCATION ZONE: NECK
LOCATION ZONE: SCALP
LOCATION ZONE: ARM
LOCATION ZONE: LEG

## 2025-03-25 ASSESSMENT — LOCATION SIMPLE DESCRIPTION DERM
LOCATION SIMPLE: RIGHT AXILLARY VAULT
LOCATION SIMPLE: CHEST
LOCATION SIMPLE: LEFT CHEEK
LOCATION SIMPLE: LEFT ANTERIOR AXILLA
LOCATION SIMPLE: LEFT POSTERIOR AXILLA
LOCATION SIMPLE: LEFT UPPER ARM
LOCATION SIMPLE: LEFT THIGH
LOCATION SIMPLE: RIGHT UPPER BACK
LOCATION SIMPLE: LEFT SCALP
LOCATION SIMPLE: RIGHT ANTERIOR NECK
LOCATION SIMPLE: RIGHT UPPER ARM
LOCATION SIMPLE: SUPERIOR FOREHEAD
LOCATION SIMPLE: RIGHT THIGH

## 2025-03-25 ASSESSMENT — LOCATION DETAILED DESCRIPTION DERM
LOCATION DETAILED: LEFT ANTERIOR PROXIMAL UPPER ARM
LOCATION DETAILED: RIGHT ANTERIOR DISTAL THIGH
LOCATION DETAILED: LEFT ANTERIOR PROXIMAL THIGH
LOCATION DETAILED: LEFT SUPERIOR LATERAL BUCCAL CHEEK
LOCATION DETAILED: RIGHT INFERIOR LATERAL UPPER BACK
LOCATION DETAILED: LEFT POSTERIOR AXILLA
LOCATION DETAILED: RIGHT DISTAL POSTERIOR UPPER ARM
LOCATION DETAILED: RIGHT SUPERIOR MEDIAL UPPER BACK
LOCATION DETAILED: LEFT DISTAL POSTERIOR UPPER ARM
LOCATION DETAILED: LEFT MEDIAL FRONTAL SCALP
LOCATION DETAILED: STERNUM
LOCATION DETAILED: LEFT ANTERIOR AXILLA
LOCATION DETAILED: RIGHT AXILLARY VAULT
LOCATION DETAILED: SUPERIOR MID FOREHEAD
LOCATION DETAILED: LEFT ANTERIOR DISTAL THIGH
LOCATION DETAILED: RIGHT CLAVICULAR NECK
LOCATION DETAILED: RIGHT ANTERIOR MEDIAL PROXIMAL UPPER ARM

## 2025-03-25 NOTE — PROCEDURE: SKIN TAG REMOVAL MULTI (COSMETIC)
Removed With: scissors
Detail Level: Detailed
Total Number Of Lesions Treated: 14
Consent: Written consent obtained and the risks of skin tag removal was reviewed with the patient including but not limited to bleeding, pigmentary change, infection, pain, and remote possibility of scarring.
Price (Use Numbers Only, No Special Characters Or $): 240.00
Anesthesia Type: 1% lidocaine without epinephrine and a 1:10 solution of 8.4% sodium bicarbonate
Anesthesia Volume In Cc: 3

## 2025-03-28 ENCOUNTER — APPOINTMENT (OUTPATIENT)
Dept: URBAN - METROPOLITAN AREA CLINIC 4 | Facility: CLINIC | Age: 60
Setting detail: DERMATOLOGY
End: 2025-03-28

## 2025-03-28 DIAGNOSIS — L91.8 OTHER HYPERTROPHIC DISORDERS OF THE SKIN: ICD-10-CM

## 2025-03-28 DIAGNOSIS — S31000A OPEN WOUND(S) (MULTIPLE) OF UNSPECIFIED SITE(S), WITHOUT MENTION OF COMPLICATION: ICD-10-CM

## 2025-03-28 PROBLEM — S21.209A UNSPECIFIED OPEN WOUND OF UNSPECIFIED BACK WALL OF THORAX WITHOUT PENETRATION INTO THORACIC CAVITY, INITIAL ENCOUNTER: Status: ACTIVE | Noted: 2025-03-28

## 2025-03-28 PROCEDURE — ? TREATMENT REGIMEN

## 2025-03-28 PROCEDURE — ? ADDITIONAL NOTES

## 2025-03-28 PROCEDURE — 99212 OFFICE O/P EST SF 10 MIN: CPT

## 2025-03-28 ASSESSMENT — LOCATION DETAILED DESCRIPTION DERM
LOCATION DETAILED: RIGHT CLAVICULAR NECK
LOCATION DETAILED: RIGHT INFERIOR LATERAL UPPER BACK

## 2025-03-28 ASSESSMENT — LOCATION ZONE DERM
LOCATION ZONE: TRUNK
LOCATION ZONE: NECK

## 2025-03-28 ASSESSMENT — LOCATION SIMPLE DESCRIPTION DERM
LOCATION SIMPLE: RIGHT UPPER BACK
LOCATION SIMPLE: RIGHT ANTERIOR NECK

## 2025-03-28 NOTE — PROCEDURE: ADDITIONAL NOTES
Additional Notes: Removed three additional tags  at no charge. Included in previous visit.
Detail Level: Simple
Render Risk Assessment In Note?: no

## 2025-03-28 NOTE — PROCEDURE: TREATMENT REGIMEN
Detail Level: Zone
Plan: Wound cleaned, Vaseline  and bandage applied. Return Tuesday for wound check.

## 2025-04-01 ENCOUNTER — APPOINTMENT (OUTPATIENT)
Dept: URBAN - METROPOLITAN AREA CLINIC 4 | Facility: CLINIC | Age: 60
Setting detail: DERMATOLOGY
End: 2025-04-01

## 2025-04-01 DIAGNOSIS — S31000A OPEN WOUND(S) (MULTIPLE) OF UNSPECIFIED SITE(S), WITHOUT MENTION OF COMPLICATION: ICD-10-CM

## 2025-04-01 PROBLEM — S21.209A UNSPECIFIED OPEN WOUND OF UNSPECIFIED BACK WALL OF THORAX WITHOUT PENETRATION INTO THORACIC CAVITY, INITIAL ENCOUNTER: Status: ACTIVE | Noted: 2025-04-01

## 2025-04-01 PROCEDURE — ? TREATMENT REGIMEN

## 2025-04-01 PROCEDURE — 99212 OFFICE O/P EST SF 10 MIN: CPT

## 2025-04-01 ASSESSMENT — LOCATION DETAILED DESCRIPTION DERM: LOCATION DETAILED: RIGHT INFERIOR LATERAL UPPER BACK

## 2025-04-01 ASSESSMENT — LOCATION SIMPLE DESCRIPTION DERM: LOCATION SIMPLE: RIGHT UPPER BACK

## 2025-04-01 ASSESSMENT — LOCATION ZONE DERM: LOCATION ZONE: TRUNK

## 2025-04-07 ENCOUNTER — APPOINTMENT (OUTPATIENT)
Dept: URBAN - METROPOLITAN AREA CLINIC 4 | Facility: CLINIC | Age: 60
Setting detail: DERMATOLOGY
End: 2025-04-07

## 2025-04-07 DIAGNOSIS — S31000A OPEN WOUND(S) (MULTIPLE) OF UNSPECIFIED SITE(S), WITHOUT MENTION OF COMPLICATION: ICD-10-CM

## 2025-04-07 PROBLEM — S21.209A UNSPECIFIED OPEN WOUND OF UNSPECIFIED BACK WALL OF THORAX WITHOUT PENETRATION INTO THORACIC CAVITY, INITIAL ENCOUNTER: Status: ACTIVE | Noted: 2025-04-07

## 2025-04-07 PROCEDURE — 99212 OFFICE O/P EST SF 10 MIN: CPT

## 2025-04-07 PROCEDURE — ? ADDITIONAL NOTES

## 2025-04-07 PROCEDURE — ? COUNSELING

## 2025-04-07 ASSESSMENT — LOCATION ZONE DERM: LOCATION ZONE: TRUNK

## 2025-04-07 ASSESSMENT — LOCATION SIMPLE DESCRIPTION DERM: LOCATION SIMPLE: RIGHT UPPER BACK

## 2025-04-07 ASSESSMENT — LOCATION DETAILED DESCRIPTION DERM: LOCATION DETAILED: RIGHT INFERIOR LATERAL UPPER BACK

## 2025-07-09 ENCOUNTER — APPOINTMENT (OUTPATIENT)
Dept: URBAN - METROPOLITAN AREA CLINIC 4 | Facility: CLINIC | Age: 60
Setting detail: DERMATOLOGY
End: 2025-07-09

## 2025-07-09 DIAGNOSIS — Z71.89 OTHER SPECIFIED COUNSELING: ICD-10-CM

## 2025-07-09 DIAGNOSIS — L82.1 OTHER SEBORRHEIC KERATOSIS: ICD-10-CM

## 2025-07-09 DIAGNOSIS — L81.4 OTHER MELANIN HYPERPIGMENTATION: ICD-10-CM

## 2025-07-09 DIAGNOSIS — D22 MELANOCYTIC NEVI: ICD-10-CM

## 2025-07-09 DIAGNOSIS — L57.0 ACTINIC KERATOSIS: ICD-10-CM

## 2025-07-09 PROBLEM — D22.5 MELANOCYTIC NEVI OF TRUNK: Status: ACTIVE | Noted: 2025-07-09

## 2025-07-09 PROCEDURE — ? COUNSELING

## 2025-07-09 PROCEDURE — ? SUNSCREEN RECOMMENDATIONS

## 2025-07-09 PROCEDURE — ? LIQUID NITROGEN

## 2025-07-09 ASSESSMENT — LOCATION SIMPLE DESCRIPTION DERM
LOCATION SIMPLE: ABDOMEN
LOCATION SIMPLE: RIGHT UPPER BACK
LOCATION SIMPLE: UPPER BACK
LOCATION SIMPLE: RIGHT UPPER ARM
LOCATION SIMPLE: RIGHT FOREARM
LOCATION SIMPLE: LEFT UPPER BACK
LOCATION SIMPLE: LEFT FOREARM
LOCATION SIMPLE: POSTERIOR SCALP
LOCATION SIMPLE: ANTERIOR SCALP

## 2025-07-09 ASSESSMENT — LOCATION DETAILED DESCRIPTION DERM
LOCATION DETAILED: MID-OCCIPITAL SCALP
LOCATION DETAILED: RIGHT SUPERIOR LATERAL UPPER BACK
LOCATION DETAILED: EPIGASTRIC SKIN
LOCATION DETAILED: LEFT DISTAL DORSAL FOREARM
LOCATION DETAILED: LEFT LATERAL UPPER BACK
LOCATION DETAILED: RIGHT PROXIMAL POSTERIOR UPPER ARM
LOCATION DETAILED: LEFT PROXIMAL DORSAL FOREARM
LOCATION DETAILED: MID-FRONTAL SCALP
LOCATION DETAILED: INFERIOR THORACIC SPINE
LOCATION DETAILED: RIGHT PROXIMAL DORSAL FOREARM

## 2025-07-09 ASSESSMENT — LOCATION ZONE DERM
LOCATION ZONE: TRUNK
LOCATION ZONE: SCALP
LOCATION ZONE: ARM

## (undated) DEVICE — SET LEADWIRE 5 LEAD BEDSIDE DISPOSABLE ECG (1SET OF 5/EA)

## (undated) DEVICE — SUCTION TUBE FRAZIER 12FR STERILE (40EA/CA)

## (undated) DEVICE — SPONGE GAUZESTER 4 X 4 4PLY - (128PK/CA)

## (undated) DEVICE — Device

## (undated) DEVICE — CELLSAVER PACK

## (undated) DEVICE — SOD. CHL. INJ. 0.9% 1000 ML - (14EA/CA 60CA/PF)

## (undated) DEVICE — COVER MAYO STAND X-LG - (22EA/CA)

## (undated) DEVICE — GOWN SURGEONS LARGE - (32/CA)

## (undated) DEVICE — ELECTRODE DUAL RETURN W/ CORD - (50/PK)

## (undated) DEVICE — GLOVE BIOGEL SZ 6.5 SURGICAL PF LTX (50PR/BX 4BX/CA)

## (undated) DEVICE — SUCTION INSTRUMENT YANKAUER BULBOUS TIP W/O VENT (50EA/CA)

## (undated) DEVICE — ELECTRODE EMG SAFEOP EMG AND SSEP NEEDLE KIT (1EA)

## (undated) DEVICE — DRAPE STRLE REG TOWEL 18X24 - (10/BX 4BX/CA)"

## (undated) DEVICE — TOWEL STOP TIMEOUT SAFETY FLAG (40EA/CA)

## (undated) DEVICE — GLOVE BIOGEL SZ 8 SURGICAL PF LTX - (50PR/BX 4BX/CA)

## (undated) DEVICE — ENDO PEANUT 5MM DEVICE (12EA/BX)

## (undated) DEVICE — TUBING CLEARLINK DUO-VENT - C-FLO (48EA/CA)

## (undated) DEVICE — TOOL MR8 21CM MATCH HEAD 3MM DIAMETER (1/EA)

## (undated) DEVICE — BLANKET WARMING LOWER BODY (10EA/CA)

## (undated) DEVICE — LACTATED RINGERS INJ. 500 ML - (24EA/CA)

## (undated) DEVICE — CANISTER SUCTION 3000ML MECHANICAL FILTER AUTO SHUTOFF MEDI-VAC NONSTERILE LF DISP  (40EA/CA)

## (undated) DEVICE — PACK JACKSON TABLE KIT W/OUT - HR (6EA/CA)

## (undated) DEVICE — DECANTER FLD BLS - (50/CA)

## (undated) DEVICE — LACTATED RINGERS INJ 1000 ML - (14EA/CA 60CA/PF)

## (undated) DEVICE — SET EXTENSION WITH 2 PORTS (48EA/CA) ***PART #2C8610 IS A SUBSTITUTE*****

## (undated) DEVICE — CELLSAVER STAT

## (undated) DEVICE — GLOVE BIOGEL INDICATOR SZ 6.5 SURGICAL PF LTX - (50PR/BX 4BX/CA)

## (undated) DEVICE — SUTURE GENERAL

## (undated) DEVICE — COVER LIGHT HANDLE ALC PLUS DISP (18EA/BX)

## (undated) DEVICE — CLOSURE SKIN STRIP 1/2 X 4 IN - (STERI STRIP) (50/BX 4BX/CA)

## (undated) DEVICE — SUTURE 0 VICRYL PLUS CT-1 - 8 X 18 INCH (12/BX)

## (undated) DEVICE — SLEEVE, VASO, THIGH, MED

## (undated) DEVICE — DRAPE LAPAROTOMY T SHEET - (12EA/CA)

## (undated) DEVICE — GLOVE BIOGEL PI INDICATOR SZ 6.5 SURGICAL PF LF - (50/BX 4BX/CA)

## (undated) DEVICE — DRAPE C-ARM LARGE 41IN X 74 IN - (10/BX 2BX/CA)

## (undated) DEVICE — RESERVOIR SUCTION 100 CC - SILICONE (20EA/CA)

## (undated) DEVICE — BONE WAX (12PK/BX)

## (undated) DEVICE — SEALER BIPOLAR 2.3 AQUAMANTYS

## (undated) DEVICE — DRAPE LARGE 3 QUARTER - (20/CA)

## (undated) DEVICE — ARMREST CRADLE FOAM - (2PR/PK 12PR/CA)

## (undated) DEVICE — PIN PERCUTANEOUS STERILE 150MM

## (undated) DEVICE — STERI STRIP COMPOUND BENZOIN - TINCTURE 0.6ML WITH APPLICATOR (40EA/BX)

## (undated) DEVICE — DRESSING POST OP BORDER 4INX6IN AG (70/CA)

## (undated) DEVICE — TOWELS CLOTH SURGICAL - (4/PK 20PK/CA)

## (undated) DEVICE — GLOVE BIOGEL PI INDICATOR SZ 7.0 SURGICAL PF LF - (50/BX 4BX/CA)

## (undated) DEVICE — TOOL MR8 14CM MATCH HD SYM-TRI 3MM DIAMETER (1/EA)

## (undated) DEVICE — PACK NEURO - (2EA/CA)

## (undated) DEVICE — GLOVE SZ 6.5 BIOGEL PI MICRO - PF LF (50PR/BX)

## (undated) DEVICE — CONTAINER SPECIMEN BAG OR - STERILE 4 OZ W/LID (100EA/CA)

## (undated) DEVICE — DRAIN J-VAC 7MM FLAT - (10EA/CA)

## (undated) DEVICE — LIGHT FIBER OPTIC ILLUMINATION SYSTEM STERILE

## (undated) DEVICE — GOWN SURGEONS X-LARGE - DISP. (30/CA)

## (undated) DEVICE — DRAPE C ARMOR (12EA/CA)

## (undated) DEVICE — BLADE SURGICAL CLIPPER - (50EA/CA)

## (undated) DEVICE — SENSOR OXIMETER ADULT SPO2 RD SET (20EA/BX)

## (undated) DEVICE — SODIUM CHL IRRIGATION 0.9% 1000ML (12EA/CA)

## (undated) DEVICE — GLOVE BIOGEL INDICATOR SZ 8 SURGICAL PF LTX - (50/BX 4BX/CA)

## (undated) DEVICE — DRAPE 36X28IN RAD CARM BND BG - (25/CA) O

## (undated) DEVICE — GLOVE SZ 6 BIOGEL PI MICRO - PF LF (50PR/BX 4BX/CA)

## (undated) DEVICE — DRAPE SRG LG BCK TBL DISP - 10/CA

## (undated) DEVICE — BOVIE BLADE COATED &INSULATED - 25/PK

## (undated) DEVICE — GOWN WARMING STANDARD FLEX - (30/CA)

## (undated) DEVICE — FIBRILLAR SURGICEL 4X4 - 10/CA

## (undated) DEVICE — SYRINGE 30 ML LL (56/BX)

## (undated) DEVICE — SPHERE NAVIGATION STEALTH (5EA/TY 12TY/PK)

## (undated) DEVICE — SPONGE XRAY 8X4 STERL. 12PL - (10EA/TY 80TY/CA)

## (undated) DEVICE — PROBE SAFEOP STIMULATING STERILE

## (undated) DEVICE — HEADREST PRONEVIEW LARGE - (10/CA)

## (undated) DEVICE — MIDAS LUBRICATOR DIFFUSER PACK (4EA/CA)

## (undated) DEVICE — CHLORAPREP 26 ML APPLICATOR - ORANGE TINT(25/CA)

## (undated) DEVICE — BOVIE  BLADE 6 EXTENDED - (50/PK)

## (undated) DEVICE — TUBING C&T SET FLYING LEADS DRAIN TUBING (10EA/BX)

## (undated) DEVICE — DRAPE MAYO STAND - (30/CA)

## (undated) DEVICE — SUTURE 2-0 VICRYL PLUS CT-1 - 8 X 18 INCH(12/BX)

## (undated) DEVICE — TRAY CATHETER FOLEY URINE METER W/STATLOCK 350ML (10EA/CA)

## (undated) DEVICE — KIT SURGIFLO W/OUT THROMBIN - (6EA/CA)